# Patient Record
Sex: FEMALE | Race: WHITE | Employment: PART TIME | URBAN - METROPOLITAN AREA
[De-identification: names, ages, dates, MRNs, and addresses within clinical notes are randomized per-mention and may not be internally consistent; named-entity substitution may affect disease eponyms.]

---

## 2017-11-22 ENCOUNTER — HOSPITAL ENCOUNTER (EMERGENCY)
Facility: HOSPITAL | Age: 52
Discharge: HOME | End: 2017-11-22
Attending: EMERGENCY MEDICINE
Payer: COMMERCIAL

## 2017-11-22 VITALS
DIASTOLIC BLOOD PRESSURE: 87 MMHG | OXYGEN SATURATION: 100 % | BODY MASS INDEX: 23 KG/M2 | TEMPERATURE: 99 F | SYSTOLIC BLOOD PRESSURE: 121 MMHG | WEIGHT: 119.94 LBS | RESPIRATION RATE: 16 BRPM | HEART RATE: 69 BPM

## 2017-11-22 DIAGNOSIS — S69.91XA FISH HOOK INJURY OF FINGER OF RIGHT HAND, INITIAL ENCOUNTER: Primary | ICD-10-CM

## 2017-11-22 PROCEDURE — 10120 INC&RMVL FB SUBQ TISS SMPL: CPT

## 2017-11-22 PROCEDURE — 10120 INC&RMVL FB SUBQ TISS SMPL: CPT | Performed by: PHYSICIAN ASSISTANT

## 2017-11-22 PROCEDURE — 99283 EMERGENCY DEPT VISIT LOW MDM: CPT

## 2017-11-22 PROCEDURE — 90471 IMMUNIZATION ADMIN: CPT

## 2017-11-22 RX ORDER — CEPHALEXIN 500 MG/1
500 CAPSULE ORAL 2 TIMES DAILY
Qty: 14 CAPSULE | Refills: 0 | Status: SHIPPED | OUTPATIENT
Start: 2017-11-22 | End: 2017-11-29

## 2017-11-22 NOTE — ED PROVIDER NOTES
Patient Seen in: [facility] Emergency Department    History   Patient presents with:  FB in Skin (integumentary)    Stated Complaint: FB right index fishing lure    HPI        Past Medical History:   Diagnosis Date   • Colitis    • Colitis    • Fibr [id number]  [phone]    Schedule an appointment as soon as possible for a visit  If symptoms worsen    KEVIN Garcia/ [address] [facility]  [phone]    Schedule an appointment as soon as possible for a visit  If symptoms wors

## 2017-11-22 NOTE — ED PROVIDER NOTES
Patient Seen in: [facility] Emergency Department    History   Patient presents with:  FB in Skin (integumentary)    Stated Complaint: FB right index fishing lure    HPI    Tashia Escalona is a 61-year-old female who comes in today complaining of a fishing rhea Head: Normocephalic and atraumatic. Neck: Normal range of motion. Cardiovascular: Normal rate, regular rhythm, normal heart sounds and intact distal pulses. Pulmonary/Chest: Effort normal and breath sounds normal. No respiratory distress.  She has no Verbal consent was obtained from the patient. The  finger was anesthetized in the usual fashion using a digital block with 2 mL of 1% lidocaine without epinephrine.  Needle nose pliers were used to push the fish hook through the skin, colleen snipped and hook

## 2019-06-28 ENCOUNTER — HOSPITAL ENCOUNTER (EMERGENCY)
Facility: HOSPITAL | Age: 54
Discharge: HOME | End: 2019-06-28
Attending: EMERGENCY MEDICINE
Payer: COMMERCIAL

## 2019-06-28 ENCOUNTER — TELEPHONE (OUTPATIENT)
Dept: OBGYN CLINIC | Facility: CLINIC | Age: 54
End: 2019-06-28

## 2019-06-28 VITALS
HEART RATE: 71 BPM | HEIGHT: 60 IN | OXYGEN SATURATION: 98 % | SYSTOLIC BLOOD PRESSURE: 116 MMHG | DIASTOLIC BLOOD PRESSURE: 74 MMHG | RESPIRATION RATE: 18 BRPM | WEIGHT: 125 LBS | TEMPERATURE: 99 F | BODY MASS INDEX: 24.54 KG/M2

## 2019-06-28 DIAGNOSIS — N93.8 DYSFUNCTIONAL UTERINE BLEEDING: Primary | ICD-10-CM

## 2019-06-28 LAB
ALBUMIN SERPL-MCNC: 3.6 G/DL (ref 3.4–5)
ALBUMIN/GLOB SERPL: 1.2 {RATIO} (ref 1–2)
ALP LIVER SERPL-CCNC: 64 U/L (ref 41–108)
ALT SERPL-CCNC: 16 U/L (ref 13–56)
ANION GAP SERPL CALC-SCNC: 5 MMOL/L (ref 0–18)
AST SERPL-CCNC: 16 U/L (ref 15–37)
BASOPHILS # BLD AUTO: 0.07 X10(3) UL (ref 0–0.2)
BASOPHILS NFR BLD AUTO: 1.1 %
BILIRUB SERPL-MCNC: 0.2 MG/DL (ref 0.1–2)
BUN BLD-MCNC: 12 MG/DL (ref 7–18)
BUN/CREAT SERPL: 17.9 (ref 10–20)
CALCIUM BLD-MCNC: 8.6 MG/DL (ref 8.5–10.1)
CHLORIDE SERPL-SCNC: 109 MMOL/L (ref 98–112)
CO2 SERPL-SCNC: 27 MMOL/L (ref 21–32)
CREAT BLD-MCNC: 0.67 MG/DL (ref 0.55–1.02)
DEPRECATED RDW RBC AUTO: 42.4 FL (ref 35.1–46.3)
EOSINOPHIL # BLD AUTO: 0.18 X10(3) UL (ref 0–0.7)
EOSINOPHIL NFR BLD AUTO: 2.8 %
ERYTHROCYTE [DISTWIDTH] IN BLOOD BY AUTOMATED COUNT: 12.1 % (ref 11–15)
GLOBULIN PLAS-MCNC: 3 G/DL (ref 2.8–4.4)
GLUCOSE BLD-MCNC: 93 MG/DL (ref 70–99)
HCT VFR BLD AUTO: 38.1 % (ref 35–48)
HGB BLD-MCNC: 13 G/DL (ref 12–16)
IMM GRANULOCYTES # BLD AUTO: 0.02 X10(3) UL (ref 0–1)
IMM GRANULOCYTES NFR BLD: 0.3 %
LYMPHOCYTES # BLD AUTO: 2.01 X10(3) UL (ref 1–4)
LYMPHOCYTES NFR BLD AUTO: 31.1 %
M PROTEIN MFR SERPL ELPH: 6.6 G/DL (ref 6.4–8.2)
MCH RBC QN AUTO: 32.2 PG (ref 26–34)
MCHC RBC AUTO-ENTMCNC: 34.1 G/DL (ref 31–37)
MCV RBC AUTO: 94.3 FL (ref 80–100)
MONOCYTES # BLD AUTO: 0.5 X10(3) UL (ref 0.1–1)
MONOCYTES NFR BLD AUTO: 7.7 %
NEUTROPHILS # BLD AUTO: 3.69 X10 (3) UL (ref 1.5–7.7)
NEUTROPHILS # BLD AUTO: 3.69 X10(3) UL (ref 1.5–7.7)
NEUTROPHILS NFR BLD AUTO: 57 %
OSMOLALITY SERPL CALC.SUM OF ELEC: 291 MOSM/KG (ref 275–295)
PLATELET # BLD AUTO: 267 10(3)UL (ref 150–450)
POTASSIUM SERPL-SCNC: 4 MMOL/L (ref 3.5–5.1)
RBC # BLD AUTO: 4.04 X10(6)UL (ref 3.8–5.3)
SODIUM SERPL-SCNC: 141 MMOL/L (ref 136–145)
WBC # BLD AUTO: 6.5 X10(3) UL (ref 4–11)

## 2019-06-28 PROCEDURE — 99284 EMERGENCY DEPT VISIT MOD MDM: CPT

## 2019-06-28 PROCEDURE — 96360 HYDRATION IV INFUSION INIT: CPT

## 2019-06-28 PROCEDURE — 85025 COMPLETE CBC W/AUTO DIFF WBC: CPT | Performed by: EMERGENCY MEDICINE

## 2019-06-28 PROCEDURE — 80053 COMPREHEN METABOLIC PANEL: CPT | Performed by: EMERGENCY MEDICINE

## 2019-06-28 RX ORDER — TRAMADOL HYDROCHLORIDE 50 MG/1
TABLET ORAL EVERY 6 HOURS PRN
Qty: 10 TABLET | Refills: 0 | Status: SHIPPED | OUTPATIENT
Start: 2019-06-28 | End: 2019-07-05

## 2019-06-28 RX ORDER — ONDANSETRON 2 MG/ML
4 INJECTION INTRAMUSCULAR; INTRAVENOUS ONCE
Status: DISCONTINUED | OUTPATIENT
Start: 2019-06-28 | End: 2019-06-28

## 2019-06-28 NOTE — ED NOTES
This RN responded to Pt's call light. Pt stated \"If you guys are too busy (pelvic exam), I'll just follow-up with my doctor. I just don't want to lie down here waiting. \" Will notify Dr. Max Alcala. Pt ambulated to the washroom with steady gait noted.  ED Adamaris

## 2019-06-28 NOTE — TELEPHONE ENCOUNTER
PC with patient. Called to see how she is doing and if she is going to follow up after the ER visit? She states doing better. Mild cramping and spotting. She states she has to check her schedule and will call back to follow up.

## 2019-06-28 NOTE — ED INITIAL ASSESSMENT (HPI)
Pt to ED with complaints of generalized abdominal cramping this past week. Pt reports she has not gotten a period in the past 6 months but this week she began having light vaginal bleeding on Tuesday. Today cramping has increased.

## 2019-06-28 NOTE — ED PROVIDER NOTES
Patient Seen in: [facility] Emergency Department    History   Patient presents with:  Abdomen/Flank Pain (GI/)  Eval-G (genital)    Stated Complaint: pt states she had an ablasion last year, having abd cramping, bleeding, and NV     HPI  54-year-o (5')   Wt 56.7 kg   SpO2 98%   BMI 24.41 kg/m²         Physical Exam   Constitutional: She is oriented to person, place, and time. She appears well-developed and well-nourished. HENT:   Head: Normocephalic and atraumatic.    Eyes: Pupils are equal, round, her GYN at this time. Discussed close follow-up strict return precautions and follow-up instructions. Patient shows understanding of plan and agreeable to plan discharged home in improved condition.               Disposition and Plan     Clinical Mareni

## 2020-07-16 ENCOUNTER — OFFICE VISIT (OUTPATIENT)
Dept: OBGYN CLINIC | Facility: CLINIC | Age: 55
End: 2020-07-16
Payer: COMMERCIAL

## 2020-07-16 VITALS
DIASTOLIC BLOOD PRESSURE: 74 MMHG | SYSTOLIC BLOOD PRESSURE: 126 MMHG | HEART RATE: 76 BPM | HEIGHT: 61 IN | WEIGHT: 135 LBS | BODY MASS INDEX: 25.49 KG/M2

## 2020-07-16 DIAGNOSIS — K52.832 LYMPHOCYTIC COLITIS: ICD-10-CM

## 2020-07-16 DIAGNOSIS — M62.89 PELVIC FLOOR DYSFUNCTION: ICD-10-CM

## 2020-07-16 DIAGNOSIS — R10.2 PELVIC PAIN: Primary | ICD-10-CM

## 2020-07-16 DIAGNOSIS — N85.2 ENLARGED UTERUS: ICD-10-CM

## 2020-07-16 PROCEDURE — 99204 OFFICE O/P NEW MOD 45 MIN: CPT | Performed by: OBSTETRICS & GYNECOLOGY

## 2020-07-16 PROCEDURE — 3078F DIAST BP <80 MM HG: CPT | Performed by: OBSTETRICS & GYNECOLOGY

## 2020-07-16 PROCEDURE — 3074F SYST BP LT 130 MM HG: CPT | Performed by: OBSTETRICS & GYNECOLOGY

## 2020-07-16 PROCEDURE — 3008F BODY MASS INDEX DOCD: CPT | Performed by: OBSTETRICS & GYNECOLOGY

## 2020-07-16 RX ORDER — ALBUTEROL SULFATE 90 UG/1
2 AEROSOL, METERED RESPIRATORY (INHALATION) AS NEEDED
COMMUNITY
Start: 2020-06-08

## 2020-07-16 RX ORDER — CYCLOBENZAPRINE HCL 5 MG
5 TABLET ORAL 3 TIMES DAILY PRN
Qty: 30 TABLET | Refills: 0 | Status: ON HOLD | OUTPATIENT
Start: 2020-07-16 | End: 2020-08-14

## 2020-07-16 RX ORDER — LISINOPRIL 10 MG/1
10 TABLET ORAL DAILY
COMMUNITY
Start: 2020-07-04

## 2020-07-16 RX ORDER — BUPROPION HYDROCHLORIDE 150 MG/1
150 TABLET, EXTENDED RELEASE ORAL DAILY
COMMUNITY

## 2020-07-16 NOTE — H&P
[facility]  Obstetrics and Gynecology   History & Physical  NEW    Chief complaint: Patient presents with:  Gyn Problem: pt c/o severe pelvic pains. Seen PCP Tuesday, sent for CT scan and u/s.      Subjective:     HPI: Pascale Gonzalez is a 50 year (outside facility) - no images available    Pelvic US 7/15/2020 (outside facility) - enlarged, heterogeneous uterus with no discrete uterine fibroid identified. Tiny sub-endometrial cysts. Underlying mild adenomyosis would be difficult to exclude.  10 x 8 x Weight Sex Delivery Anes PTL Lv   10 Term 08/11/03    F NORMAL SPONT   DONOVAN   9 Term 09/09/99    F NORMAL SPONT   DONOVAN   8 Term 03/05/97    M NORMAL SPONT   DONOVAN   7 Term 11/08/95    M NORMAL SPONT   DONOVAN   6 Term 11/03/93    F NORMAL SPONT   DONOVAN   5 Term 02/2 allie 6/20/2013    Log Date: 01/30/2013    • Environmental allergies    • Essential hypertension    • GERD (gastroesophageal reflux disease) 2016   • Hypothyroid 04/16/2012   • Insomnia 2020   • Late effect of fracture of upper extremities 6/20/2013    Lo education: Not on file      Highest education level: Not on file    Occupational History      Not on file    Social Needs      Financial resource strain: Not on file      Food insecurity:        Worry: Not on file        Inability: Not on file      Transpo kg/m². Physical Exam:  Physical Exam   Nursing note and vitals reviewed. Constitutional: She is oriented to person, place, and time. She appears well-developed and well-nourished.    HENT:   Head: Normocephalic and atraumatic.   +Facemask   Eyes: Conju K 4.0 06/28/2019     06/28/2019    CO2 27.0 06/28/2019       Lab Results   Component Value Date    CHOLEST 190 09/04/2014    HDL 80 09/04/2014    LDL 98 09/04/2014    VLDL 12 09/04/2014        Lab Results   Component Value Date    T4F 1.41 09/04/2 sonohysterogram as clinically indicated.     Electronically Signed By: Lupe Polk,  on 7/15/2020 5:42 PM CDT    CT abd/pelvis with IV contrast 7/14/2020 [facility]  Epic, Interface Incoming Radiology Results - 07/14/2020  6:01 PM CDT  CT Abdomen and Pelvis wi of these painful episodes. Diagnoses and all orders for this visit:    Pelvic pain    Enlarged uterus  -     OP REFERRAL TO [facility]    Pelvic floor dysfunction  -     cyclobenzaprine 5 MG Oral Tab;  Take 1 tablet (5 mg total) by that all her pain will be resolved by this kind of procedure alone.   -she wishes to schedule hysterectomy  -will talk to OR about availability  -would recommend robotic assisted total laparoscopic hysterectomy, cystoscopy, possible laparotomy (her fallopi

## 2020-07-21 ENCOUNTER — TELEPHONE (OUTPATIENT)
Dept: OBGYN CLINIC | Facility: CLINIC | Age: 55
End: 2020-07-21

## 2020-07-21 DIAGNOSIS — R10.2 PELVIC PAIN IN FEMALE: Primary | ICD-10-CM

## 2020-07-21 DIAGNOSIS — N85.2 ENLARGED UTERUS: ICD-10-CM

## 2020-07-21 NOTE — TELEPHONE ENCOUNTER
PC with patient, she wanted to know how long it will take to schedule her surgery for hysterectomy.  Lazaro advise

## 2020-07-23 ENCOUNTER — TELEPHONE (OUTPATIENT)
Dept: OBGYN CLINIC | Facility: CLINIC | Age: 55
End: 2020-07-23

## 2020-07-28 ENCOUNTER — OFFICE VISIT (OUTPATIENT)
Dept: OBGYN CLINIC | Facility: CLINIC | Age: 55
End: 2020-07-28
Payer: COMMERCIAL

## 2020-07-28 VITALS
HEART RATE: 86 BPM | HEIGHT: 61 IN | SYSTOLIC BLOOD PRESSURE: 118 MMHG | BODY MASS INDEX: 25.3 KG/M2 | DIASTOLIC BLOOD PRESSURE: 80 MMHG | WEIGHT: 134 LBS

## 2020-07-28 DIAGNOSIS — R11.2 PONV (POSTOPERATIVE NAUSEA AND VOMITING): ICD-10-CM

## 2020-07-28 DIAGNOSIS — Z12.4 SCREENING FOR CERVICAL CANCER: ICD-10-CM

## 2020-07-28 DIAGNOSIS — N64.89 BREAST ASYMMETRY: ICD-10-CM

## 2020-07-28 DIAGNOSIS — Z01.818 PREOPERATIVE EXAM FOR GYNECOLOGIC SURGERY: ICD-10-CM

## 2020-07-28 DIAGNOSIS — N85.2 ENLARGED UTERUS: ICD-10-CM

## 2020-07-28 DIAGNOSIS — R92.2 DENSE BREASTS: ICD-10-CM

## 2020-07-28 DIAGNOSIS — N63.22 MASS OF UPPER INNER QUADRANT OF LEFT BREAST: ICD-10-CM

## 2020-07-28 DIAGNOSIS — Z98.890 PONV (POSTOPERATIVE NAUSEA AND VOMITING): ICD-10-CM

## 2020-07-28 DIAGNOSIS — R10.2 PELVIC PAIN: ICD-10-CM

## 2020-07-28 DIAGNOSIS — Z12.31 ENCOUNTER FOR SCREENING MAMMOGRAM FOR MALIGNANT NEOPLASM OF BREAST: ICD-10-CM

## 2020-07-28 DIAGNOSIS — Z01.411 ENCOUNTER FOR WELL WOMAN EXAM WITH ABNORMAL FINDINGS: Primary | ICD-10-CM

## 2020-07-28 PROCEDURE — 3074F SYST BP LT 130 MM HG: CPT | Performed by: OBSTETRICS & GYNECOLOGY

## 2020-07-28 PROCEDURE — 88175 CYTOPATH C/V AUTO FLUID REDO: CPT | Performed by: OBSTETRICS & GYNECOLOGY

## 2020-07-28 PROCEDURE — 3008F BODY MASS INDEX DOCD: CPT | Performed by: OBSTETRICS & GYNECOLOGY

## 2020-07-28 PROCEDURE — 87624 HPV HI-RISK TYP POOLED RSLT: CPT | Performed by: OBSTETRICS & GYNECOLOGY

## 2020-07-28 PROCEDURE — 3079F DIAST BP 80-89 MM HG: CPT | Performed by: OBSTETRICS & GYNECOLOGY

## 2020-07-28 PROCEDURE — 99396 PREV VISIT EST AGE 40-64: CPT | Performed by: OBSTETRICS & GYNECOLOGY

## 2020-07-28 RX ORDER — SCOLOPAMINE TRANSDERMAL SYSTEM 1 MG/1
1 PATCH, EXTENDED RELEASE TRANSDERMAL
Qty: 3 PATCH | Refills: 0 | Status: SHIPPED | OUTPATIENT
Start: 2020-07-28 | End: 2020-09-01

## 2020-07-28 NOTE — H&P
[facility]  Obstetrics and Gynecology     Chief complaint: Patient presents with:  Wellness Visit  Pre-Op Exam: hysterectomy 8/14/20    Subjective:     HPI: Rosa Mcdermott is a 50year old [id number] with LMP Patient's last menstrual period was 08/ Hx:   Menarche [age]   Is sexually active. Sometimes dyspareunia. H/o enlarged uterus but without distinct fibroids noted as far back as 3/2013 pelvic US.    [address], D&C 6/12/2017 - reportedly with polyps  3/14/2018 [facility], D&C, HTA ablation & laparoscopic bi NORMAL SPONT   DONOVAN   8 Term 03/05/97    M NORMAL SPONT   DONOVAN   7 Term 11/08/95    M NORMAL SPONT   DONOVAN   6 Term 11/03/93    F NORMAL SPONT   DONOVAN   5 Term 02/26/86    M NORMAL SPONT   DONOVAN   4 SAB            3 SAB            2 SAB            1 SAB adenomyosis.     • Enthesopathy of wrist and carpus 6/20/2013    Log Date: 01/30/2013    • Environmental allergies    • Essential hypertension    • GERD (gastroesophageal reflux disease) 2016   • Hypothyroid 04/16/2012   • Insomnia 2020   • Late effect of f History:  Social History    Socioeconomic History      Marital status:       Spouse name: Not on file      Number of children: Not on file      Years of education: Not on file      Highest education level: Not on file    Occupational History      No severe dysmenorrhea. Objective:      07/28/20  1049   BP: 118/80   Pulse: 86   Weight: 134 lb (60.8 kg)   Height: 61\"       Body mass index is 25.32 kg/m². Physical Exam:  Physical Exam   Nursing note and vitals reviewed.    Constitutional: She GFRNAA 101 06/28/2019    GFRAA 117 06/28/2019    CA 8.6 06/28/2019    OSMOCALC 291 06/28/2019    ALKPHO 64 06/28/2019    AST 16 06/28/2019    ALT 16 06/28/2019    BILT 0.2 06/28/2019    TP 6.6 06/28/2019    ALB 3.6 06/28/2019    GLOBULIN 3.0 06/28/2019 cysts. Underlying mild adenomyosis would be difficult to exclude. If indicated this can be further evaluated with MRI. 2. 10 x 8 x 9 mm hypoechoic focus mid to upper endometrium.  Question possible underlying scar or adhesion this appearance is somewhat Elmira Stern is a 50year old [id number] likely perimenopausal female here for well woman exam and preoperative visit. She desires definitive management for her pelvic pain, which she believes is uterine in origin.    She has an enlarged uterus without -she is doing ok today. Flexeril helped. -Pelvic floor PT referral given previously. Still encouraged.   -she still wants to proceed with surgery to remove uterus.    -plan robotic assisted total laparoscopic hysterectomy, cystoscopy, possible laparotom

## 2020-07-28 NOTE — H&P (VIEW-ONLY)
[facility]  Obstetrics and Gynecology     Chief complaint: Patient presents with:  Wellness Visit  Pre-Op Exam: hysterectomy 8/14/20    Subjective:     HPI: Lorena New is a 50year old [id number] with LMP Patient's last menstrual period was 08/ Hx:   Menarche [age]   Is sexually active. Sometimes dyspareunia. H/o enlarged uterus but without distinct fibroids noted as far back as 3/2013 pelvic US.    [address], D&C 6/12/2017 - reportedly with polyps  3/14/2018 [facility], D&C, HTA ablation & laparoscopic bi NORMAL SPONT   DONOVAN   8 Term 03/05/97    M NORMAL SPONT   DONOVAN   7 Term 11/08/95    M NORMAL SPONT   DONOVAN   6 Term 11/03/93    F NORMAL SPONT   DONOVAN   5 Term 02/26/86    M NORMAL SPONT   DONOVAN   4 SAB            3 SAB            2 SAB            1 SAB adenomyosis.     • Enthesopathy of wrist and carpus 6/20/2013    Log Date: 01/30/2013    • Environmental allergies    • Essential hypertension    • GERD (gastroesophageal reflux disease) 2016   • Hypothyroid 04/16/2012   • Insomnia 2020   • Late effect of f History:  Social History    Socioeconomic History      Marital status:       Spouse name: Not on file      Number of children: Not on file      Years of education: Not on file      Highest education level: Not on file    Occupational History      No severe dysmenorrhea. Objective:      07/28/20  1049   BP: 118/80   Pulse: 86   Weight: 134 lb (60.8 kg)   Height: 61\"       Body mass index is 25.32 kg/m². Physical Exam:  Physical Exam   Nursing note and vitals reviewed.    Constitutional: She GFRNAA 101 06/28/2019    GFRAA 117 06/28/2019    CA 8.6 06/28/2019    OSMOCALC 291 06/28/2019    ALKPHO 64 06/28/2019    AST 16 06/28/2019    ALT 16 06/28/2019    BILT 0.2 06/28/2019    TP 6.6 06/28/2019    ALB 3.6 06/28/2019    GLOBULIN 3.0 06/28/2019 cysts. Underlying mild adenomyosis would be difficult to exclude. If indicated this can be further evaluated with MRI. 2. 10 x 8 x 9 mm hypoechoic focus mid to upper endometrium.  Question possible underlying scar or adhesion this appearance is somewhat Emily Preciado is a 50year old [id number] likely perimenopausal female here for well woman exam and preoperative visit. She desires definitive management for her pelvic pain, which she believes is uterine in origin.    She has an enlarged uterus without -she is doing ok today. Flexeril helped. -Pelvic floor PT referral given previously. Still encouraged.   -she still wants to proceed with surgery to remove uterus.    -plan robotic assisted total laparoscopic hysterectomy, cystoscopy, possible laparotom

## 2020-07-29 LAB — HPV I/H RISK 1 DNA SPEC QL NAA+PROBE: NEGATIVE

## 2020-08-03 ENCOUNTER — TELEPHONE (OUTPATIENT)
Dept: OBGYN CLINIC | Facility: CLINIC | Age: 55
End: 2020-08-03

## 2020-08-03 ENCOUNTER — HOSPITAL ENCOUNTER (OUTPATIENT)
Dept: MAMMOGRAPHY | Facility: HOSPITAL | Age: 55
Discharge: HOME | End: 2020-08-03
Attending: OBSTETRICS & GYNECOLOGY
Payer: COMMERCIAL

## 2020-08-03 DIAGNOSIS — N63.22 MASS OF UPPER INNER QUADRANT OF LEFT BREAST: ICD-10-CM

## 2020-08-03 DIAGNOSIS — R92.2 DENSE BREASTS: ICD-10-CM

## 2020-08-03 DIAGNOSIS — N64.89 BREAST ASYMMETRY: ICD-10-CM

## 2020-08-03 DIAGNOSIS — R92.8 ABNORMAL MAMMOGRAM OF LEFT BREAST: Primary | ICD-10-CM

## 2020-08-03 PROCEDURE — 76642 ULTRASOUND BREAST LIMITED: CPT | Performed by: OBSTETRICS & GYNECOLOGY

## 2020-08-03 PROCEDURE — 77062 BREAST TOMOSYNTHESIS BI: CPT | Performed by: OBSTETRICS & GYNECOLOGY

## 2020-08-03 PROCEDURE — 77066 DX MAMMO INCL CAD BI: CPT | Performed by: OBSTETRICS & GYNECOLOGY

## 2020-08-03 NOTE — TELEPHONE ENCOUNTER
Patient states that the medication Dr. Edward Lawrence prescribed (cyclobenzaprine 5 MG Oral ) is not helping with her pain. Patient also took Tramadol 50 mg before without any relief. Patient is requesting pain medications.  Will check with Dr. Edward Lawrence and omar

## 2020-08-03 NOTE — TELEPHONE ENCOUNTER
Patient aware of Dr. Phu Hester recommendation to increase Flexeril to 10mg every 8 hrs as needed and if no improvement patient to go to ER. Patient verbalizes understanding.

## 2020-08-03 NOTE — IMAGING NOTE
Assisted Dr Tyler Jensen with left breast biopsy recommendation, BIRADS 4a. Explained procedure and written instructions given. Pt screened and denies blood thinners, bleeding issues, chemo or liver disease.  Reviewed to eat, take 1000 mg of acetaminophen, and

## 2020-08-03 NOTE — TELEPHONE ENCOUNTER
[facility] [place] [facility] radiology called with request for order for Ultrasound guided left breast biopsy.  Order placed and routed to provider

## 2020-08-05 RX ORDER — SODIUM CHLORIDE, SODIUM LACTATE, POTASSIUM CHLORIDE, CALCIUM CHLORIDE 600; 310; 30; 20 MG/100ML; MG/100ML; MG/100ML; MG/100ML
INJECTION, SOLUTION INTRAVENOUS CONTINUOUS
Status: CANCELLED | OUTPATIENT
Start: 2020-08-05

## 2020-08-08 ENCOUNTER — APPOINTMENT (OUTPATIENT)
Dept: LAB | Facility: HOSPITAL | Age: 55
End: 2020-08-08
Payer: COMMERCIAL

## 2020-08-08 DIAGNOSIS — R10.2 PELVIC PAIN IN FEMALE: ICD-10-CM

## 2020-08-08 LAB
ANION GAP SERPL CALC-SCNC: 4 MMOL/L (ref 0–18)
BUN BLD-MCNC: 10 MG/DL (ref 7–18)
BUN/CREAT SERPL: 14.9 (ref 10–20)
CALCIUM BLD-MCNC: 8.9 MG/DL (ref 8.5–10.1)
CHLORIDE SERPL-SCNC: 108 MMOL/L (ref 98–112)
CO2 SERPL-SCNC: 28 MMOL/L (ref 21–32)
CREAT BLD-MCNC: 0.67 MG/DL (ref 0.55–1.02)
DEPRECATED RDW RBC AUTO: 42 FL (ref 35.1–46.3)
ERYTHROCYTE [DISTWIDTH] IN BLOOD BY AUTOMATED COUNT: 12 % (ref 11–15)
GLUCOSE BLD-MCNC: 91 MG/DL (ref 70–99)
HCT VFR BLD AUTO: 39.9 % (ref 35–48)
HGB BLD-MCNC: 13.7 G/DL (ref 12–16)
MCH RBC QN AUTO: 32.6 PG (ref 26–34)
MCHC RBC AUTO-ENTMCNC: 34.3 G/DL (ref 31–37)
MCV RBC AUTO: 95 FL (ref 80–100)
OSMOLALITY SERPL CALC.SUM OF ELEC: 289 MOSM/KG (ref 275–295)
PATIENT FASTING Y/N/NP: YES
PLATELET # BLD AUTO: 269 10(3)UL (ref 150–450)
POTASSIUM SERPL-SCNC: 4.1 MMOL/L (ref 3.5–5.1)
RBC # BLD AUTO: 4.2 X10(6)UL (ref 3.8–5.3)
SODIUM SERPL-SCNC: 140 MMOL/L (ref 136–145)
WBC # BLD AUTO: 4.9 X10(3) UL (ref 4–11)

## 2020-08-08 PROCEDURE — 36415 COLL VENOUS BLD VENIPUNCTURE: CPT

## 2020-08-08 PROCEDURE — 80048 BASIC METABOLIC PNL TOTAL CA: CPT

## 2020-08-08 PROCEDURE — 85027 COMPLETE CBC AUTOMATED: CPT

## 2020-08-11 ENCOUNTER — LAB ENCOUNTER (OUTPATIENT)
Dept: LAB | Facility: HOSPITAL | Age: 55
End: 2020-08-11
Attending: OBSTETRICS & GYNECOLOGY
Payer: COMMERCIAL

## 2020-08-11 DIAGNOSIS — R10.2 PELVIC PAIN IN FEMALE: ICD-10-CM

## 2020-08-12 ENCOUNTER — HOSPITAL ENCOUNTER (OUTPATIENT)
Dept: MAMMOGRAPHY | Facility: HOSPITAL | Age: 55
Discharge: HOME | End: 2020-08-12
Attending: OBSTETRICS & GYNECOLOGY
Payer: COMMERCIAL

## 2020-08-12 DIAGNOSIS — R92.8 ABNORMAL MAMMOGRAM OF LEFT BREAST: ICD-10-CM

## 2020-08-12 LAB — SARS-COV-2 RNA RESP QL NAA+PROBE: NOT DETECTED

## 2020-08-12 PROCEDURE — 88305 TISSUE EXAM BY PATHOLOGIST: CPT | Performed by: OBSTETRICS & GYNECOLOGY

## 2020-08-12 PROCEDURE — 19083 BX BREAST 1ST LESION US IMAG: CPT | Performed by: OBSTETRICS & GYNECOLOGY

## 2020-08-12 PROCEDURE — 77065 DX MAMMO INCL CAD UNI: CPT | Performed by: OBSTETRICS & GYNECOLOGY

## 2020-08-13 ENCOUNTER — TELEPHONE (OUTPATIENT)
Dept: OBGYN CLINIC | Facility: CLINIC | Age: 55
End: 2020-08-13

## 2020-08-13 ENCOUNTER — ANESTHESIA EVENT (OUTPATIENT)
Dept: SURGERY | Facility: HOSPITAL | Age: 55
End: 2020-08-13
Payer: COMMERCIAL

## 2020-08-13 NOTE — TELEPHONE ENCOUNTER
Dr Khang Huffman would like us to check with patient on if she would like to proceed with surgery as planned tomorrow. Patient underwent a breast biopsy today and doctor has a concern about surgery based on what her results will be.  LM for patient to call offi

## 2020-08-14 ENCOUNTER — HOSPITAL ENCOUNTER (OUTPATIENT)
Facility: HOSPITAL | Age: 55
Discharge: HOME | End: 2020-08-15
Attending: OBSTETRICS & GYNECOLOGY | Admitting: OBSTETRICS & GYNECOLOGY
Payer: COMMERCIAL

## 2020-08-14 ENCOUNTER — ANESTHESIA (OUTPATIENT)
Dept: SURGERY | Facility: HOSPITAL | Age: 55
End: 2020-08-14
Payer: COMMERCIAL

## 2020-08-14 ENCOUNTER — TELEPHONE (OUTPATIENT)
Dept: MAMMOGRAPHY | Facility: HOSPITAL | Age: 55
End: 2020-08-14

## 2020-08-14 DIAGNOSIS — M62.89 PELVIC FLOOR DYSFUNCTION: ICD-10-CM

## 2020-08-14 DIAGNOSIS — R10.2 PELVIC PAIN IN FEMALE: Primary | ICD-10-CM

## 2020-08-14 DIAGNOSIS — N85.2 ENLARGED UTERUS: ICD-10-CM

## 2020-08-14 PROBLEM — Z98.890 STATUS POST CYSTOSCOPY: Status: ACTIVE | Noted: 2020-08-14

## 2020-08-14 PROBLEM — Z90.710 STATUS POST LAPAROSCOPIC HYSTERECTOMY: Status: ACTIVE | Noted: 2020-08-14

## 2020-08-14 LAB
POCT LOT NUMBER: NORMAL
POCT URINE PREGNANCY: NEGATIVE

## 2020-08-14 PROCEDURE — 8E0W8CZ ROBOTIC ASSISTED PROCEDURE OF TRUNK REGION, VIA NATURAL OR ARTIFICIAL OPENING ENDOSCOPIC: ICD-10-PCS | Performed by: OBSTETRICS & GYNECOLOGY

## 2020-08-14 PROCEDURE — 0UT94ZZ RESECTION OF UTERUS, PERCUTANEOUS ENDOSCOPIC APPROACH: ICD-10-PCS | Performed by: OBSTETRICS & GYNECOLOGY

## 2020-08-14 PROCEDURE — 58570 TLH UTERUS 250 G OR LESS: CPT | Performed by: OBSTETRICS & GYNECOLOGY

## 2020-08-14 RX ORDER — ONDANSETRON 2 MG/ML
4 INJECTION INTRAMUSCULAR; INTRAVENOUS EVERY 8 HOURS PRN
Status: DISCONTINUED | OUTPATIENT
Start: 2020-08-14 | End: 2020-08-15

## 2020-08-14 RX ORDER — ACETAMINOPHEN 500 MG
1000 TABLET ORAL ONCE
COMMUNITY
End: 2020-09-01

## 2020-08-14 RX ORDER — TRAMADOL HYDROCHLORIDE 50 MG/1
50 TABLET ORAL EVERY 6 HOURS PRN
Status: DISCONTINUED | OUTPATIENT
Start: 2020-08-14 | End: 2020-08-15

## 2020-08-14 RX ORDER — LISINOPRIL 10 MG/1
10 TABLET ORAL NIGHTLY
Status: DISCONTINUED | OUTPATIENT
Start: 2020-08-14 | End: 2020-08-15

## 2020-08-14 RX ORDER — TEMAZEPAM 7.5 MG/1
7.5 CAPSULE ORAL NIGHTLY PRN
COMMUNITY

## 2020-08-14 RX ORDER — KETOROLAC TROMETHAMINE 30 MG/ML
INJECTION, SOLUTION INTRAMUSCULAR; INTRAVENOUS AS NEEDED
Status: DISCONTINUED | OUTPATIENT
Start: 2020-08-14 | End: 2020-08-14 | Stop reason: SURG

## 2020-08-14 RX ORDER — ROCURONIUM BROMIDE 10 MG/ML
INJECTION, SOLUTION INTRAVENOUS AS NEEDED
Status: DISCONTINUED | OUTPATIENT
Start: 2020-08-14 | End: 2020-08-14 | Stop reason: SURG

## 2020-08-14 RX ORDER — CYCLOBENZAPRINE HCL 10 MG
10 TABLET ORAL 3 TIMES DAILY PRN
Status: DISCONTINUED | OUTPATIENT
Start: 2020-08-14 | End: 2020-08-15

## 2020-08-14 RX ORDER — DEXAMETHASONE SODIUM PHOSPHATE 4 MG/ML
VIAL (ML) INJECTION AS NEEDED
Status: DISCONTINUED | OUTPATIENT
Start: 2020-08-14 | End: 2020-08-14 | Stop reason: SURG

## 2020-08-14 RX ORDER — MIDAZOLAM HYDROCHLORIDE 1 MG/ML
1 INJECTION INTRAMUSCULAR; INTRAVENOUS EVERY 5 MIN PRN
Status: DISCONTINUED | OUTPATIENT
Start: 2020-08-14 | End: 2020-08-14 | Stop reason: HOSPADM

## 2020-08-14 RX ORDER — TEMAZEPAM 7.5 MG/1
7.5 CAPSULE ORAL NIGHTLY PRN
Status: DISCONTINUED | OUTPATIENT
Start: 2020-08-14 | End: 2020-08-15

## 2020-08-14 RX ORDER — ONDANSETRON 2 MG/ML
4 INJECTION INTRAMUSCULAR; INTRAVENOUS EVERY 6 HOURS PRN
Status: DISCONTINUED | OUTPATIENT
Start: 2020-08-14 | End: 2020-08-14 | Stop reason: HOSPADM

## 2020-08-14 RX ORDER — HYDROMORPHONE HYDROCHLORIDE 1 MG/ML
0.2 INJECTION, SOLUTION INTRAMUSCULAR; INTRAVENOUS; SUBCUTANEOUS EVERY 2 HOUR PRN
Status: DISCONTINUED | OUTPATIENT
Start: 2020-08-14 | End: 2020-08-15

## 2020-08-14 RX ORDER — LEVOTHYROXINE SODIUM 0.05 MG/1
50 TABLET ORAL
Status: DISCONTINUED | OUTPATIENT
Start: 2020-08-15 | End: 2020-08-15

## 2020-08-14 RX ORDER — HYDROMORPHONE HYDROCHLORIDE 1 MG/ML
0.4 INJECTION, SOLUTION INTRAMUSCULAR; INTRAVENOUS; SUBCUTANEOUS EVERY 2 HOUR PRN
Status: DISCONTINUED | OUTPATIENT
Start: 2020-08-14 | End: 2020-08-15

## 2020-08-14 RX ORDER — SODIUM CHLORIDE, SODIUM LACTATE, POTASSIUM CHLORIDE, CALCIUM CHLORIDE 600; 310; 30; 20 MG/100ML; MG/100ML; MG/100ML; MG/100ML
INJECTION, SOLUTION INTRAVENOUS CONTINUOUS
Status: DISCONTINUED | OUTPATIENT
Start: 2020-08-14 | End: 2020-08-14 | Stop reason: HOSPADM

## 2020-08-14 RX ORDER — ALBUTEROL SULFATE 90 UG/1
2 AEROSOL, METERED RESPIRATORY (INHALATION) EVERY 4 HOURS PRN
Status: DISCONTINUED | OUTPATIENT
Start: 2020-08-14 | End: 2020-08-15

## 2020-08-14 RX ORDER — BISACODYL 10 MG
10 SUPPOSITORY, RECTAL RECTAL
Status: DISCONTINUED | OUTPATIENT
Start: 2020-08-14 | End: 2020-08-15

## 2020-08-14 RX ORDER — ACETAMINOPHEN 500 MG
1000 TABLET ORAL ONCE
Status: DISCONTINUED | OUTPATIENT
Start: 2020-08-14 | End: 2020-08-14 | Stop reason: HOSPADM

## 2020-08-14 RX ORDER — EPHEDRINE SULFATE 50 MG/ML
INJECTION, SOLUTION INTRAVENOUS AS NEEDED
Status: DISCONTINUED | OUTPATIENT
Start: 2020-08-14 | End: 2020-08-14 | Stop reason: SURG

## 2020-08-14 RX ORDER — BUPIVACAINE HYDROCHLORIDE 5 MG/ML
INJECTION, SOLUTION EPIDURAL; INTRACAUDAL AS NEEDED
Status: DISCONTINUED | OUTPATIENT
Start: 2020-08-14 | End: 2020-08-14 | Stop reason: HOSPADM

## 2020-08-14 RX ORDER — NALOXONE HYDROCHLORIDE 0.4 MG/ML
80 INJECTION, SOLUTION INTRAMUSCULAR; INTRAVENOUS; SUBCUTANEOUS AS NEEDED
Status: DISCONTINUED | OUTPATIENT
Start: 2020-08-14 | End: 2020-08-14 | Stop reason: HOSPADM

## 2020-08-14 RX ORDER — HYDROMORPHONE HYDROCHLORIDE 1 MG/ML
0.8 INJECTION, SOLUTION INTRAMUSCULAR; INTRAVENOUS; SUBCUTANEOUS EVERY 2 HOUR PRN
Status: DISCONTINUED | OUTPATIENT
Start: 2020-08-14 | End: 2020-08-15

## 2020-08-14 RX ORDER — DIPHENHYDRAMINE HYDROCHLORIDE 50 MG/ML
12.5 INJECTION INTRAMUSCULAR; INTRAVENOUS EVERY 4 HOURS PRN
Status: DISCONTINUED | OUTPATIENT
Start: 2020-08-14 | End: 2020-08-15

## 2020-08-14 RX ORDER — CEFAZOLIN SODIUM/WATER 2 G/20 ML
2 SYRINGE (ML) INTRAVENOUS ONCE
Status: COMPLETED | OUTPATIENT
Start: 2020-08-14 | End: 2020-08-14

## 2020-08-14 RX ORDER — KETOROLAC TROMETHAMINE 30 MG/ML
30 INJECTION, SOLUTION INTRAMUSCULAR; INTRAVENOUS EVERY 6 HOURS
Status: DISCONTINUED | OUTPATIENT
Start: 2020-08-14 | End: 2020-08-15

## 2020-08-14 RX ORDER — DOCUSATE SODIUM 100 MG/1
100 CAPSULE, LIQUID FILLED ORAL 2 TIMES DAILY
Status: DISCONTINUED | OUTPATIENT
Start: 2020-08-14 | End: 2020-08-15

## 2020-08-14 RX ORDER — CYCLOBENZAPRINE HCL 5 MG
TABLET ORAL 3 TIMES DAILY PRN
Qty: 30 TABLET | Refills: 0 | Status: SHIPPED | OUTPATIENT
Start: 2020-08-14 | End: 2020-09-01

## 2020-08-14 RX ORDER — MIDAZOLAM HYDROCHLORIDE 1 MG/ML
INJECTION INTRAMUSCULAR; INTRAVENOUS AS NEEDED
Status: DISCONTINUED | OUTPATIENT
Start: 2020-08-14 | End: 2020-08-14 | Stop reason: SURG

## 2020-08-14 RX ORDER — ONDANSETRON 2 MG/ML
4 INJECTION INTRAMUSCULAR; INTRAVENOUS AS NEEDED
Status: DISCONTINUED | OUTPATIENT
Start: 2020-08-14 | End: 2020-08-14 | Stop reason: HOSPADM

## 2020-08-14 RX ORDER — SODIUM PHOSPHATE, DIBASIC AND SODIUM PHOSPHATE, MONOBASIC 7; 19 G/133ML; G/133ML
1 ENEMA RECTAL ONCE AS NEEDED
Status: DISCONTINUED | OUTPATIENT
Start: 2020-08-14 | End: 2020-08-15

## 2020-08-14 RX ORDER — ENOXAPARIN SODIUM 100 MG/ML
40 INJECTION SUBCUTANEOUS NIGHTLY
Status: DISCONTINUED | OUTPATIENT
Start: 2020-08-14 | End: 2020-08-15

## 2020-08-14 RX ORDER — METOCLOPRAMIDE HYDROCHLORIDE 5 MG/ML
10 INJECTION INTRAMUSCULAR; INTRAVENOUS AS NEEDED
Status: DISCONTINUED | OUTPATIENT
Start: 2020-08-14 | End: 2020-08-14 | Stop reason: HOSPADM

## 2020-08-14 RX ORDER — DROPERIDOL 2.5 MG/ML
INJECTION, SOLUTION INTRAMUSCULAR; INTRAVENOUS AS NEEDED
Status: DISCONTINUED | OUTPATIENT
Start: 2020-08-14 | End: 2020-08-14 | Stop reason: SURG

## 2020-08-14 RX ORDER — ONDANSETRON 4 MG/1
4 TABLET, FILM COATED ORAL EVERY 8 HOURS PRN
Status: DISCONTINUED | OUTPATIENT
Start: 2020-08-14 | End: 2020-08-15

## 2020-08-14 RX ORDER — TRAMADOL HYDROCHLORIDE 50 MG/1
100 TABLET ORAL EVERY 6 HOURS PRN
Status: DISCONTINUED | OUTPATIENT
Start: 2020-08-14 | End: 2020-08-15

## 2020-08-14 RX ORDER — TRAMADOL HYDROCHLORIDE 50 MG/1
TABLET ORAL EVERY 4 HOURS PRN
Qty: 20 TABLET | Refills: 0 | Status: SHIPPED | OUTPATIENT
Start: 2020-08-14 | End: 2020-09-01

## 2020-08-14 RX ORDER — DEXTROSE AND SODIUM CHLORIDE 5; .9 G/100ML; G/100ML
INJECTION, SOLUTION INTRAVENOUS CONTINUOUS
Status: DISCONTINUED | OUTPATIENT
Start: 2020-08-14 | End: 2020-08-15

## 2020-08-14 RX ORDER — CEFAZOLIN SODIUM/WATER 2 G/20 ML
SYRINGE (ML) INTRAVENOUS
Status: DISPENSED
Start: 2020-08-14 | End: 2020-08-14

## 2020-08-14 RX ORDER — LIDOCAINE HYDROCHLORIDE 10 MG/ML
INJECTION, SOLUTION EPIDURAL; INFILTRATION; INTRACAUDAL; PERINEURAL AS NEEDED
Status: DISCONTINUED | OUTPATIENT
Start: 2020-08-14 | End: 2020-08-14 | Stop reason: SURG

## 2020-08-14 RX ORDER — DIPHENHYDRAMINE HYDROCHLORIDE 50 MG/ML
12.5 INJECTION INTRAMUSCULAR; INTRAVENOUS AS NEEDED
Status: DISCONTINUED | OUTPATIENT
Start: 2020-08-14 | End: 2020-08-14 | Stop reason: HOSPADM

## 2020-08-14 RX ORDER — BUPROPION HYDROCHLORIDE 75 MG/1
150 TABLET ORAL DAILY
Status: DISCONTINUED | OUTPATIENT
Start: 2020-08-15 | End: 2020-08-14

## 2020-08-14 RX ORDER — BUPROPION HYDROCHLORIDE 150 MG/1
150 TABLET, EXTENDED RELEASE ORAL DAILY
Status: DISCONTINUED | OUTPATIENT
Start: 2020-08-15 | End: 2020-08-15

## 2020-08-14 RX ORDER — MEPERIDINE HYDROCHLORIDE 25 MG/ML
12.5 INJECTION INTRAMUSCULAR; INTRAVENOUS; SUBCUTANEOUS AS NEEDED
Status: DISCONTINUED | OUTPATIENT
Start: 2020-08-14 | End: 2020-08-14 | Stop reason: HOSPADM

## 2020-08-14 RX ORDER — HEPARIN SODIUM 5000 [USP'U]/ML
5000 INJECTION, SOLUTION INTRAVENOUS; SUBCUTANEOUS ONCE
Status: COMPLETED | OUTPATIENT
Start: 2020-08-14 | End: 2020-08-14

## 2020-08-14 RX ORDER — POLYETHYLENE GLYCOL 3350 17 G/17G
17 POWDER, FOR SOLUTION ORAL DAILY PRN
Status: DISCONTINUED | OUTPATIENT
Start: 2020-08-14 | End: 2020-08-15

## 2020-08-14 RX ADMIN — MIDAZOLAM HYDROCHLORIDE 2 MG: 1 INJECTION INTRAMUSCULAR; INTRAVENOUS at 10:10:00

## 2020-08-14 RX ADMIN — EPHEDRINE SULFATE 10 MG: 50 INJECTION, SOLUTION INTRAVENOUS at 11:08:00

## 2020-08-14 RX ADMIN — KETOROLAC TROMETHAMINE 30 MG: 30 INJECTION, SOLUTION INTRAMUSCULAR; INTRAVENOUS at 12:06:00

## 2020-08-14 RX ADMIN — CEFAZOLIN SODIUM/WATER 2 G: 2 G/20 ML SYRINGE (ML) INTRAVENOUS at 10:19:00

## 2020-08-14 RX ADMIN — DEXAMETHASONE SODIUM PHOSPHATE 8 MG: 4 MG/ML VIAL (ML) INJECTION at 10:26:00

## 2020-08-14 RX ADMIN — ROCURONIUM BROMIDE 20 MG: 10 INJECTION, SOLUTION INTRAVENOUS at 11:16:00

## 2020-08-14 RX ADMIN — ROCURONIUM BROMIDE 50 MG: 10 INJECTION, SOLUTION INTRAVENOUS at 10:14:00

## 2020-08-14 RX ADMIN — DROPERIDOL 0.62 MG: 2.5 INJECTION, SOLUTION INTRAMUSCULAR; INTRAVENOUS at 12:12:00

## 2020-08-14 RX ADMIN — EPHEDRINE SULFATE 5 MG: 50 INJECTION, SOLUTION INTRAVENOUS at 10:45:00

## 2020-08-14 RX ADMIN — SODIUM CHLORIDE, SODIUM LACTATE, POTASSIUM CHLORIDE, CALCIUM CHLORIDE: 600; 310; 30; 20 INJECTION, SOLUTION INTRAVENOUS at 10:44:00

## 2020-08-14 RX ADMIN — SODIUM CHLORIDE, SODIUM LACTATE, POTASSIUM CHLORIDE, CALCIUM CHLORIDE: 600; 310; 30; 20 INJECTION, SOLUTION INTRAVENOUS at 12:22:00

## 2020-08-14 RX ADMIN — LIDOCAINE HYDROCHLORIDE 50 MG: 10 INJECTION, SOLUTION EPIDURAL; INFILTRATION; INTRACAUDAL; PERINEURAL at 10:14:00

## 2020-08-14 RX ADMIN — EPHEDRINE SULFATE 5 MG: 50 INJECTION, SOLUTION INTRAVENOUS at 10:46:00

## 2020-08-14 NOTE — INTERVAL H&P NOTE
Pre-op Diagnosis: Pelvic pain in female [R10.2]  Enlarged uterus [N85.2]    The above referenced H&P was reviewed by Rosaura Subramanian MD on 8/14/2020, the patient was examined. She had a breast biopsy on the left side a few days ago.  She is somewhat sore a

## 2020-08-14 NOTE — ANESTHESIA POSTPROCEDURE EVALUATION
[address] [place] [address] Patient Status:  Outpatient in a Bed   Age/Gender 50year old female MRN [id number]   Location [address] [facility] Attending Don Escobar MD   Hosp Day # 0 PCP Galilea Flores 6

## 2020-08-14 NOTE — TELEPHONE ENCOUNTER
Telephoned Catia Mariano and name,  verified with patient. Notified Catia Mariano of negative for malignancy left breast  biopsy result. [place] verified by radiologist, Dr. Himanshu Galan. Catia Mariano reports biopsy site is healing well.  Hematoma sania

## 2020-08-14 NOTE — ANESTHESIA PREPROCEDURE EVALUATION
PRE-OP EVALUATION    Patient Name: Emily Preciado    Pre-op Diagnosis: Pelvic pain in female [R10.2]  Enlarged uterus [N85.2]    Procedure(s):  Robotic assisted total laparoscopic hysterectomy, cystoscopy, possible laparotomy    Surgeon(s) and Role:     * Neuro/Psych    Negative neuro/psych ROS.                           As per Epic:  Patient Active Problem List:     Hypothyroid     Wrist pain     Lateral epicondylitis  of elbow     Thumb pain     Late effect of sprain and strain without mention of ten • US BREAST BIOPSY Left 08/12/2020     Social History    Tobacco Use      Smoking status: Never Smoker      Smokeless tobacco: Never Used    Alcohol use: Yes      Frequency: 4 or more times a week      Drinks per session: 1 or 2      Binge frequency: Nev risks as described. All questions were answered. A desire to proceed with regional anesthesia for post operative analgesia has been demonstrated with an understanding of risks of adverse outcomes. Possible TAP block for post op analgesia, D/W patient.

## 2020-08-14 NOTE — PLAN OF CARE
Pt arrived on floor via bed from PACU. Sleepy, arousable, denies any nausea, mild pressure pain on incisions.  at bedside. Pt started on clear liquids.

## 2020-08-14 NOTE — ANESTHESIA PROCEDURE NOTES
Airway  Date/Time: 8/14/2020 10:16 AM  Urgency: elective    Airway not difficult    General Information and Staff    Patient location during procedure: OR  Anesthesiologist: Ingrid Casas MD  Resident/CRNA: Eros Queen CRNA  Performed: BILL Jesus

## 2020-08-14 NOTE — OPERATIVE REPORT
[facility]  Operative Note    David Dalal Patient Status:  Outpatient in a Bed    1965 MRN [id number]   Location [facility] UNIT Attending Shahrzad Bartlett MD   Hosp Day # 0 PCP HONG Vasquez     Date o prepped and draped in the usual fashion and cazares catheter was placed. Speculum was placed in the vagina and the cervix was grasped with a sharp tooth tenaculum. The uterus was sounded to 6-7 cm. The uterus was dilated using Hegar dilators up to 8 mm. The vaginal cuff was noted to be hemostatic following irrigation. The robot was undocked. Cystoscopy was performed. Both ureters were identified and ureteral jets were seen. The bladder appeared normal without sutures visualized.      The robotic ports

## 2020-08-15 VITALS
WEIGHT: 130.06 LBS | HEART RATE: 51 BPM | BODY MASS INDEX: 25.53 KG/M2 | HEIGHT: 60 IN | TEMPERATURE: 98 F | OXYGEN SATURATION: 99 % | RESPIRATION RATE: 16 BRPM | SYSTOLIC BLOOD PRESSURE: 127 MMHG | DIASTOLIC BLOOD PRESSURE: 88 MMHG

## 2020-08-15 LAB
BASOPHILS # BLD AUTO: 0.03 X10(3) UL (ref 0–0.2)
BASOPHILS NFR BLD AUTO: 0.3 %
DEPRECATED RDW RBC AUTO: 42 FL (ref 35.1–46.3)
EOSINOPHIL # BLD AUTO: 0.01 X10(3) UL (ref 0–0.7)
EOSINOPHIL NFR BLD AUTO: 0.1 %
ERYTHROCYTE [DISTWIDTH] IN BLOOD BY AUTOMATED COUNT: 12 % (ref 11–15)
HCT VFR BLD AUTO: 34.4 % (ref 35–48)
HGB BLD-MCNC: 11.6 G/DL (ref 12–16)
IMM GRANULOCYTES # BLD AUTO: 0.03 X10(3) UL (ref 0–1)
IMM GRANULOCYTES NFR BLD: 0.3 %
LYMPHOCYTES # BLD AUTO: 1.53 X10(3) UL (ref 1–4)
LYMPHOCYTES NFR BLD AUTO: 16.9 %
MCH RBC QN AUTO: 32 PG (ref 26–34)
MCHC RBC AUTO-ENTMCNC: 33.7 G/DL (ref 31–37)
MCV RBC AUTO: 94.8 FL (ref 80–100)
MONOCYTES # BLD AUTO: 0.76 X10(3) UL (ref 0.1–1)
MONOCYTES NFR BLD AUTO: 8.4 %
NEUTROPHILS # BLD AUTO: 6.68 X10 (3) UL (ref 1.5–7.7)
NEUTROPHILS # BLD AUTO: 6.68 X10(3) UL (ref 1.5–7.7)
NEUTROPHILS NFR BLD AUTO: 74 %
PLATELET # BLD AUTO: 252 10(3)UL (ref 150–450)
RBC # BLD AUTO: 3.63 X10(6)UL (ref 3.8–5.3)
WBC # BLD AUTO: 9 X10(3) UL (ref 4–11)

## 2020-08-15 RX ORDER — TRAMADOL HYDROCHLORIDE 50 MG/1
TABLET ORAL EVERY 4 HOURS PRN
Qty: 20 TABLET | Refills: 1 | Status: SHIPPED | OUTPATIENT
Start: 2020-08-15 | End: 2020-09-01

## 2020-08-15 RX ORDER — LEVOTHYROXINE SODIUM 0.05 MG/1
50 TABLET ORAL
Qty: 30 TABLET | Refills: 3 | Status: SHIPPED | OUTPATIENT
Start: 2020-08-16

## 2020-08-15 NOTE — PLAN OF CARE
Patient is alert and oriented x3  Up ad ifrah  Void s/p cazares removal; adequate amounts  Tolerating regular diet; denies nausea/vomiting  Incisions to ABD are CDI  Scant amount of drainage on peripad.      Patient assessed and ready for DC home  DC instructio

## 2020-08-15 NOTE — PROGRESS NOTES
POD #1 Temp: 98 °F (36.7 °C)  Pulse: 63  Resp: 16  BP: 115/68   Recent Labs   Lab 08/15/20  0522   RBC 3.63*   HGB 11.6*   HCT 34.4*   MCV 94.8   MCH 32.0   MCHC 33.7   RDW 12.0   NEPRELIM 6.68   WBC 9.0   .0     abd soft + bs  + flatus   Incisions

## 2020-08-21 NOTE — PROGRESS NOTES
Patient aware of results and recommendations. Follow up mammogram LEFT breast in 6 months recommended by radiologist. Order placed in system. Patient verbalized understanding.

## 2020-09-01 ENCOUNTER — OFFICE VISIT (OUTPATIENT)
Dept: OBGYN CLINIC | Facility: CLINIC | Age: 55
End: 2020-09-01
Payer: COMMERCIAL

## 2020-09-01 VITALS
BODY MASS INDEX: 23.79 KG/M2 | SYSTOLIC BLOOD PRESSURE: 100 MMHG | HEIGHT: 61 IN | HEART RATE: 63 BPM | WEIGHT: 126 LBS | DIASTOLIC BLOOD PRESSURE: 62 MMHG

## 2020-09-01 DIAGNOSIS — Z48.89 POSTOPERATIVE VISIT: Primary | ICD-10-CM

## 2020-09-01 DIAGNOSIS — Z98.890 STATUS POST CYSTOSCOPY: ICD-10-CM

## 2020-09-01 DIAGNOSIS — K52.832 LYMPHOCYTIC COLITIS: ICD-10-CM

## 2020-09-01 DIAGNOSIS — M62.89 PELVIC FLOOR DYSFUNCTION: ICD-10-CM

## 2020-09-01 DIAGNOSIS — R10.2 PELVIC PAIN: ICD-10-CM

## 2020-09-01 DIAGNOSIS — Z90.710 STATUS POST LAPAROSCOPIC HYSTERECTOMY: ICD-10-CM

## 2020-09-01 DIAGNOSIS — N64.89 HEMATOMA OF LEFT BREAST: ICD-10-CM

## 2020-09-01 PROBLEM — N85.2 ENLARGED UTERUS: Status: RESOLVED | Noted: 2020-07-16 | Resolved: 2020-09-01

## 2020-09-01 PROCEDURE — 3078F DIAST BP <80 MM HG: CPT | Performed by: OBSTETRICS & GYNECOLOGY

## 2020-09-01 PROCEDURE — 3074F SYST BP LT 130 MM HG: CPT | Performed by: OBSTETRICS & GYNECOLOGY

## 2020-09-01 PROCEDURE — 3008F BODY MASS INDEX DOCD: CPT | Performed by: OBSTETRICS & GYNECOLOGY

## 2020-09-01 PROCEDURE — 99024 POSTOP FOLLOW-UP VISIT: CPT | Performed by: OBSTETRICS & GYNECOLOGY

## 2020-09-01 RX ORDER — CYCLOBENZAPRINE HCL 5 MG
5 TABLET ORAL 3 TIMES DAILY PRN
Qty: 30 TABLET | Refills: 0 | Status: SHIPPED | OUTPATIENT
Start: 2020-09-01 | End: 2020-09-29

## 2020-09-01 NOTE — PROGRESS NOTES
[facility]  Obstetrics and Gynecology     Chief complaint: Patient presents with:  Gyn Problem: f/u surgery, had biopsy day before surgery and still very bruised    Subjective:     HPI: Elmira Stern is a 50year old [id number] with LMP No LMP rec normal c-scope. PCP: Jeff Boo    Review of Systems   Constitutional: Negative for fever. Respiratory: Negative. Cardiovascular: Negative. Gastrointestinal: Positive for constipation.  Negative for nausea, diarrhea, blood in sto Ergocalciferol (VITAMIN D OR), Take by mouth daily. , Disp: , Rfl:   Ascorbic Acid (VITAMIN C OR), Take by mouth daily. , Disp: , Rfl:     No current facility-administered medications on file prior to visit.        All:    Anesthetic [Benzoca*    NAUSEA History:   Procedure Laterality Date   • COLONOSCOPY N/A 10/8/2014    Performed by Burgess Herbert MD at [facility] ENDOSCOPY   • COLPOSCOPY,BX CERVIX/ENDOCERV CURR  2017    Biopsy in the office per patient.     • CYTOLOGY FINE NEEDLE  06/27/2013    [facility] LEFT thyroi file    Social Needs      Financial resource strain: Not on file      Food insecurity:        Worry: Not on file        Inability: Not on file      Transportation needs:        Medical: Not on file        Non-medical: Not on file    Tobacco Use      Isaiasin 23.81 kg/m². Physical Exam:  Physical Exam   Nursing note and vitals reviewed. Constitutional: She is oriented to person, place, and time. She appears well-developed and well-nourished.    HENT:   Head: Normocephalic and atraumatic.   +Facemask   Eyes: 108 08/08/2020    CO2 28.0 08/08/2020       Lab Results   Component Value Date    CHOLEST 190 09/04/2014    HDL 80 09/04/2014    LDL 98 09/04/2014    VLDL 12 09/04/2014        Lab Results   Component Value Date    T4F 1.41 09/04/2014    TSH 0.050 (L) 09/04 MG JAG, [facility] SOY 2D+3D DIAGNOSTIC [facility]  BILAT (SEZ=[id number]), 8/03/2020, 10:01 AM.  TECHNIQUE:  Breast ultrasound was performed, evaluating specific areas of concern.    FINDINGS:  **PLEASE SEE REPORT FOR DIAGNOSTIC MAMMOGRAM**      Dictated by (CST some urinary urgency which is improving. Continue stool softeners  Rx Flexeril  Pelvic floor PT advised     Perimenopausal. Ovaries retained. Colonoscopy - up to date. H/o lymphocytic colitis. Left breast biopsy negative 8/12/2020. +Hematoma.    -T

## 2020-09-14 ENCOUNTER — TELEPHONE (OUTPATIENT)
Dept: PHYSICAL THERAPY | Age: 55
End: 2020-09-14

## 2020-09-17 ENCOUNTER — OFFICE VISIT (OUTPATIENT)
Dept: PHYSICAL THERAPY | Age: 55
End: 2020-09-17
Attending: OBSTETRICS & GYNECOLOGY
Payer: COMMERCIAL

## 2020-09-17 PROCEDURE — 97110 THERAPEUTIC EXERCISES: CPT

## 2020-09-17 PROCEDURE — 97161 PT EVAL LOW COMPLEX 20 MIN: CPT

## 2020-09-18 NOTE — PROGRESS NOTES
MUSCULOSKELETAL AND PELVIC FLOOR EVALUATION:   Referring Physician: Dr. Harman Grove  Diagnosis: post op- partial hysterectomy 8/14     Stress incontinence Date of Service: 9/18/2020     PATIENT SUMMARY   Linda Smalls is a 50year old female  who present 04/16/2012   • Insomnia 2020   • Late effect of fracture of upper extremities 6/20/2013    Log Date: 05/03/2013    • Lymphocytic colitis 07/2016    Lymphocytic colitis   • Nontoxic multinodular goiter 6/25/2013   • Ovarian cyst    • Pap smear for cervical evaluation with primary c/o post op partial hysterectomy- weak PF . The results of the objective tests and measures show decreased core and pelvic floor strength. Functional deficits include but are not limited to sneezing, coughing without leakage.   Sign incontinence  PLAN OF CARE:    Goals: (to be met in 8 visits)  1. Pt to be I with HEP  2. Pt to demonstrate 10 sec holds at 10 mv on the biofeedback to decrease SI  3. Pt to demonstrate 10 fast contractions in 10 secs to decrease SI  4.  Pt  To report ambul

## 2020-09-22 ENCOUNTER — OFFICE VISIT (OUTPATIENT)
Dept: PHYSICAL THERAPY | Age: 55
End: 2020-09-22
Attending: FAMILY MEDICINE
Payer: COMMERCIAL

## 2020-09-22 PROCEDURE — 97110 THERAPEUTIC EXERCISES: CPT

## 2020-09-22 NOTE — PROGRESS NOTES
Dx:  post op- partial hysterectomy 8/14     Stress incontinence       Insurance (Authorized # of Visits):   8         Authorizing Physician: Dr. Reyes ref.  provider found  Next MD visit: none scheduled  Fall Risk: standard         Precautions: n/a

## 2020-09-25 ENCOUNTER — APPOINTMENT (OUTPATIENT)
Dept: PHYSICAL THERAPY | Age: 55
End: 2020-09-25
Attending: FAMILY MEDICINE
Payer: COMMERCIAL

## 2020-09-28 NOTE — PROGRESS NOTES
[facility]  Obstetrics and Gynecology     Chief complaint: Patient presents with:  Post-Op: 6 wk f/u, doing well    Subjective:     HPI: Janneth Dang is a 50year old [id number] here for 6 wk postop visit.      8/14/2020 - S/p robotic-assisted tot OB History:  OB History     T6    L6    SAB4  TAB0  Ectopic0  Multiple0  Live Births6   OB History    Para Term  AB Living   10 6 6 0 4 6   SAB TAB Ectopic Multiple Live Births   4 0 0 0 6      # Outcome Date GA Lbr Amos/2nd 2020   • ASCUS of cervix with negative high risk HPV 04/12/2017    [facility] system.     • Carpal tunnel syndrome 6/20/2013    Log Date: 05/01/2013    • Colitis    • Disorder of thyroid    • Enlarged uterus     Some imaging says \"possible fibroids\" but nothing laparoscopic hysterectomy, cystoscopy - Path: Adenomyosis.  Dr. Robertson Never   • [facility]  03/14/2018    Hysteroscopy, dilation and curettage, hydrothermal ablation, laparoscopic bilateral salpingectomy   • [facility]    Lifestyle      Physical activity        Days per week: Not on file        Minutes per session: Not on file      Stress: Not on file    Relationships      Social connections        Talks on phone: Not on file        Gets together: No consistent with hematoma. Non-fluctuant. Only slightly tender    Abdominal: Soft. She exhibits distension. She exhibits no mass. There is no abdominal tenderness. There is no rebound and no guarding. Abdomen mildly distended & diffusely tympanitic.  No gu

## 2020-09-29 ENCOUNTER — OFFICE VISIT (OUTPATIENT)
Dept: OBGYN CLINIC | Facility: CLINIC | Age: 55
End: 2020-09-29
Payer: COMMERCIAL

## 2020-09-29 ENCOUNTER — OFFICE VISIT (OUTPATIENT)
Dept: PHYSICAL THERAPY | Age: 55
End: 2020-09-29
Attending: FAMILY MEDICINE
Payer: COMMERCIAL

## 2020-09-29 VITALS
HEART RATE: 84 BPM | DIASTOLIC BLOOD PRESSURE: 62 MMHG | HEIGHT: 61 IN | BODY MASS INDEX: 22.84 KG/M2 | SYSTOLIC BLOOD PRESSURE: 102 MMHG | WEIGHT: 121 LBS

## 2020-09-29 DIAGNOSIS — Z48.89 POSTOPERATIVE VISIT: Primary | ICD-10-CM

## 2020-09-29 DIAGNOSIS — M62.89 PELVIC FLOOR DYSFUNCTION: ICD-10-CM

## 2020-09-29 DIAGNOSIS — Z28.21 INFLUENZA VACCINATION DECLINED: ICD-10-CM

## 2020-09-29 DIAGNOSIS — Z90.710 STATUS POST LAPAROSCOPIC HYSTERECTOMY: ICD-10-CM

## 2020-09-29 DIAGNOSIS — Z98.890 STATUS POST CYSTOSCOPY: ICD-10-CM

## 2020-09-29 PROCEDURE — 3078F DIAST BP <80 MM HG: CPT | Performed by: OBSTETRICS & GYNECOLOGY

## 2020-09-29 PROCEDURE — 3074F SYST BP LT 130 MM HG: CPT | Performed by: OBSTETRICS & GYNECOLOGY

## 2020-09-29 PROCEDURE — 97110 THERAPEUTIC EXERCISES: CPT

## 2020-09-29 PROCEDURE — 99024 POSTOP FOLLOW-UP VISIT: CPT | Performed by: OBSTETRICS & GYNECOLOGY

## 2020-09-29 PROCEDURE — 3008F BODY MASS INDEX DOCD: CPT | Performed by: OBSTETRICS & GYNECOLOGY

## 2020-09-29 NOTE — PROGRESS NOTES
Dx:  post op- partial hysterectomy 8/14     Stress incontinence       Insurance (Authorized # of Visits):   8         Authorizing Physician: Dr. Reyes ref.  provider found  Next MD visit: none scheduled  Fall Risk: standard         Precautions: n/a for home                            HEP:  Clams and bridges    Charges: EX 3        Total Timed Treatment: 45 min  Total Treatment Time: 45 min

## 2020-09-30 ENCOUNTER — APPOINTMENT (OUTPATIENT)
Dept: PHYSICAL THERAPY | Age: 55
End: 2020-09-30
Payer: COMMERCIAL

## 2020-10-02 ENCOUNTER — APPOINTMENT (OUTPATIENT)
Dept: PHYSICAL THERAPY | Age: 55
End: 2020-10-02
Attending: FAMILY MEDICINE
Payer: COMMERCIAL

## 2020-10-06 ENCOUNTER — OFFICE VISIT (OUTPATIENT)
Dept: PHYSICAL THERAPY | Age: 55
End: 2020-10-06
Attending: FAMILY MEDICINE
Payer: COMMERCIAL

## 2020-10-06 PROCEDURE — 97110 THERAPEUTIC EXERCISES: CPT

## 2020-10-06 NOTE — PROGRESS NOTES
Dx:  post op- partial hysterectomy 8/14     Stress incontinence       Insurance (Authorized # of Visits):   8         Authorizing Physician: Dr. Reyes ref.  provider found  Next MD visit: none scheduled  Fall Risk: standard         Precautions: n/a 10 mins   Cues for breathing- techs for home  Prone: cervical and thoracic spine mobs uni and central   Attempted Long sitting CT junction 10 mins    Occipital release 5 mins     Cupping R low back 5 mins  Roller thoracic spine for ext on table 5 mins

## 2020-10-09 ENCOUNTER — APPOINTMENT (OUTPATIENT)
Dept: PHYSICAL THERAPY | Age: 55
End: 2020-10-09
Attending: FAMILY MEDICINE
Payer: COMMERCIAL

## 2020-10-13 ENCOUNTER — OFFICE VISIT (OUTPATIENT)
Dept: PHYSICAL THERAPY | Age: 55
End: 2020-10-13
Attending: FAMILY MEDICINE
Payer: COMMERCIAL

## 2020-10-13 PROCEDURE — 97110 THERAPEUTIC EXERCISES: CPT

## 2020-10-13 NOTE — PROGRESS NOTES
Dx:  post op- partial hysterectomy 8/14     Stress incontinence       Insurance (Authorized # of Visits):   8         Authorizing Physician: Dr. Caro Wilkinson MD visit: none scheduled  Fall Risk: standard         Precautions: n/a             Sub mobs uni and central   Attempted Long sitting CT junction 10 mins    Occipital release 5 mins     Cupping R low back 5 mins  Roller thoracic spine for ext on table 5 mins  Bent leg fall out   Fast contractions  Contractions for 10 sec holds     Ball over h

## 2020-10-16 ENCOUNTER — APPOINTMENT (OUTPATIENT)
Dept: PHYSICAL THERAPY | Age: 55
End: 2020-10-16
Attending: FAMILY MEDICINE
Payer: COMMERCIAL

## 2020-10-20 ENCOUNTER — OFFICE VISIT (OUTPATIENT)
Dept: PHYSICAL THERAPY | Age: 55
End: 2020-10-20
Attending: FAMILY MEDICINE
Payer: COMMERCIAL

## 2020-10-20 PROCEDURE — 97110 THERAPEUTIC EXERCISES: CPT

## 2020-10-20 NOTE — PROGRESS NOTES
Dx:  post op- partial hysterectomy 8/14     Stress incontinence       Insurance (Authorized # of Visits):   8         Authorizing Physician: Dr. Mardene Goodpasture  Next MD visit: none scheduled  Fall Risk: standard         Precautions: n/a             Sub thoracic spine mobs uni and central   Attempted Long sitting CT junction 10 mins    Occipital release 5 mins     Cupping R low back 5 mins  Roller thoracic spine for ext on table 5 mins  Bent leg fall out   Fast contractions  Contractions for 10 sec holds

## 2020-10-23 ENCOUNTER — APPOINTMENT (OUTPATIENT)
Dept: PHYSICAL THERAPY | Age: 55
End: 2020-10-23
Attending: FAMILY MEDICINE
Payer: COMMERCIAL

## 2020-10-27 ENCOUNTER — OFFICE VISIT (OUTPATIENT)
Dept: PHYSICAL THERAPY | Age: 55
End: 2020-10-27
Attending: FAMILY MEDICINE
Payer: COMMERCIAL

## 2020-10-27 PROCEDURE — 97110 THERAPEUTIC EXERCISES: CPT

## 2020-10-27 NOTE — PROGRESS NOTES
Dx:  post op- partial hysterectomy 8/14     Stress incontinence       Insurance (Authorized # of Visits):   8         Authorizing Physician: Dr. Reyes ref.  provider found  Next MD visit: none scheduled  Fall Risk: standard         Precautions: n/a mins  CT junction x 2 in long sitting  Rows gTB 10 x 2   Ext over the foam roller 5 mins   Clams with GTB 10 x 2  With PF contractions    Bridge 10 x 2 with PF contractions  biofeedback   Contractions and fast contractions 10 x each   Bent leg fall outs 10

## 2020-10-30 ENCOUNTER — APPOINTMENT (OUTPATIENT)
Dept: PHYSICAL THERAPY | Age: 55
End: 2020-10-30
Attending: FAMILY MEDICINE
Payer: COMMERCIAL

## 2020-11-03 ENCOUNTER — OFFICE VISIT (OUTPATIENT)
Dept: PHYSICAL THERAPY | Age: 55
End: 2020-11-03
Attending: FAMILY MEDICINE
Payer: COMMERCIAL

## 2020-11-03 PROCEDURE — 97110 THERAPEUTIC EXERCISES: CPT

## 2020-11-03 NOTE — PROGRESS NOTES
Dx:  post op- partial hysterectomy 8/14     Stress incontinence       Insurance (Authorized # of Visits):   8         Authorizing Physician: Dr. Reyes ref.  provider found  Next MD visit: none scheduled  Fall Risk: standard         Precautions: n/a uni and central   Attempted Long sitting CT junction 10 mins    Occipital release 5 mins     Cupping R low back 5 mins  Roller thoracic spine for ext on table 5 mins  Bent leg fall out   Fast contractions  Contractions for 10 sec holds     Ball over head w

## 2020-11-06 ENCOUNTER — APPOINTMENT (OUTPATIENT)
Dept: PHYSICAL THERAPY | Age: 55
End: 2020-11-06
Attending: FAMILY MEDICINE
Payer: COMMERCIAL

## 2020-11-10 ENCOUNTER — APPOINTMENT (OUTPATIENT)
Dept: PHYSICAL THERAPY | Age: 55
End: 2020-11-10
Attending: FAMILY MEDICINE
Payer: COMMERCIAL

## 2021-11-17 ENCOUNTER — HOSPITAL ENCOUNTER (EMERGENCY)
Facility: HOSPITAL | Age: 56
Discharge: LEFT BEFORE DISCHARGE | End: 2021-11-17
Payer: COMMERCIAL

## 2021-11-17 PROCEDURE — 93005 ELECTROCARDIOGRAM TRACING: CPT

## 2022-01-13 ENCOUNTER — OFFICE VISIT (OUTPATIENT)
Dept: OBGYN CLINIC | Facility: CLINIC | Age: 57
End: 2022-01-13
Payer: COMMERCIAL

## 2022-01-13 VITALS
HEIGHT: 61 IN | DIASTOLIC BLOOD PRESSURE: 70 MMHG | WEIGHT: 112.19 LBS | SYSTOLIC BLOOD PRESSURE: 110 MMHG | HEART RATE: 91 BPM | BODY MASS INDEX: 21.18 KG/M2

## 2022-01-13 DIAGNOSIS — Z98.890 HISTORY OF LEFT BREAST BIOPSY: ICD-10-CM

## 2022-01-13 DIAGNOSIS — Z12.39 SCREENING BREAST EXAMINATION: ICD-10-CM

## 2022-01-13 DIAGNOSIS — N81.11 CYSTOCELE, MIDLINE: ICD-10-CM

## 2022-01-13 DIAGNOSIS — N89.8 VAGINAL ITCHING: ICD-10-CM

## 2022-01-13 DIAGNOSIS — N95.2 VAGINAL ATROPHY: ICD-10-CM

## 2022-01-13 DIAGNOSIS — Z12.31 ENCOUNTER FOR SCREENING MAMMOGRAM FOR MALIGNANT NEOPLASM OF BREAST: ICD-10-CM

## 2022-01-13 DIAGNOSIS — Z90.710 STATUS POST LAPAROSCOPIC HYSTERECTOMY: ICD-10-CM

## 2022-01-13 DIAGNOSIS — Z01.411 ENCOUNTER FOR WELL WOMAN EXAM WITH ABNORMAL FINDINGS: Primary | ICD-10-CM

## 2022-01-13 PROCEDURE — 3008F BODY MASS INDEX DOCD: CPT | Performed by: OBSTETRICS & GYNECOLOGY

## 2022-01-13 PROCEDURE — 99396 PREV VISIT EST AGE 40-64: CPT | Performed by: OBSTETRICS & GYNECOLOGY

## 2022-01-13 PROCEDURE — 3074F SYST BP LT 130 MM HG: CPT | Performed by: OBSTETRICS & GYNECOLOGY

## 2022-01-13 PROCEDURE — 87510 GARDNER VAG DNA DIR PROBE: CPT | Performed by: OBSTETRICS & GYNECOLOGY

## 2022-01-13 PROCEDURE — 3078F DIAST BP <80 MM HG: CPT | Performed by: OBSTETRICS & GYNECOLOGY

## 2022-01-13 PROCEDURE — 99214 OFFICE O/P EST MOD 30 MIN: CPT | Performed by: OBSTETRICS & GYNECOLOGY

## 2022-01-13 PROCEDURE — 87480 CANDIDA DNA DIR PROBE: CPT | Performed by: OBSTETRICS & GYNECOLOGY

## 2022-01-13 PROCEDURE — 87660 TRICHOMONAS VAGIN DIR PROBE: CPT | Performed by: OBSTETRICS & GYNECOLOGY

## 2022-01-13 NOTE — H&P
[facility]  Obstetrics and Gynecology   H&P  Established    Chief complaint: Patient presents with:  Wellness Visit  Gyn Problem: Discuss menopause    Subjective:     HPI: Jimena Muniz is a 49year old [id number]   Here for [employer].  Discuss m were bad but have subsided. Genitourinary: Positive for bladder incontinence, vaginal pain, dyspareunia and hot flashes.  Negative for dysuria, urgency, frequency, vaginal bleeding, vaginal discharge, menstrual problem, pelvic pain, breast mass and breas Disp: 30 tablet, Rfl: 3  temazepam 7.5 MG Oral Cap, Take 7.5 mg by mouth nightly as needed for Sleep., Disp: , Rfl:   lisinopril 10 MG Oral Tab, Take 10 mg by mouth daily.   , Disp: , Rfl:   Albuterol Sulfate  (90 Base) MCG/ACT Inhalation Aero Soln, smear for cervical cancer screening 07/28/2020    Pap & HPV negative. • PONV (postoperative nausea and vomiting)     uses scop patche 1 day before & at least 1 week after.  BP drop with epidural   • PTSD (post-traumatic stress disorder) 2020   • Screenin HERNIA REPAIR     • US BREAST BIOPSY Left 08/12/2020    US-guided LEFT breast biopsy - BENIGN - fibrocystic changes including microcyst formation, dense fibrosis and focal secretory change.  Complicated by large hematoma (approximately 6 cm or so)        So kg/m². Physical Exam:  Physical Exam   Nursing note and vitals reviewed. Constitutional: She is oriented to person, place, and time. She appears well-developed and well-nourished.    HENT:   Head: Normocephalic and atraumatic.   +Facemask   Eyes: Conju 2D+3D SCREENING BILAT (CPT=77067/83326); Future    Status post laparoscopic hysterectomy    History of left breast biopsy  -     [facility] SOY 2D+3D SCREENING BILAT (CPT=77067/78703);  Future    Cystocele, midline  -     OP REFERRAL TO UROGYNECOLOGY CLINIC    [place]

## 2022-01-14 PROBLEM — Z98.890 HISTORY OF LEFT BREAST BIOPSY: Status: ACTIVE | Noted: 2022-01-14

## 2022-01-14 PROBLEM — N81.11 CYSTOCELE, MIDLINE: Status: ACTIVE | Noted: 2022-01-14

## 2022-01-19 NOTE — PROGRESS NOTES
Pt aware of results & recommendations for Metrogel. Discussed other recommendations for boric acid or Clairvee. She will read [employer] message and call with any questions. Metrogel pended. Verbalized understanding.

## 2022-02-03 ENCOUNTER — HOSPITAL ENCOUNTER (OUTPATIENT)
Dept: MAMMOGRAPHY | Age: 57
Discharge: HOME | End: 2022-02-03
Attending: OBSTETRICS & GYNECOLOGY
Payer: COMMERCIAL

## 2022-02-03 DIAGNOSIS — Z98.890 HISTORY OF LEFT BREAST BIOPSY: ICD-10-CM

## 2022-02-03 DIAGNOSIS — Z12.31 ENCOUNTER FOR SCREENING MAMMOGRAM FOR MALIGNANT NEOPLASM OF BREAST: ICD-10-CM

## 2022-02-03 PROCEDURE — 77063 BREAST TOMOSYNTHESIS BI: CPT | Performed by: OBSTETRICS & GYNECOLOGY

## 2022-02-03 PROCEDURE — 77067 SCR MAMMO BI INCL CAD: CPT | Performed by: OBSTETRICS & GYNECOLOGY

## 2022-02-08 ENCOUNTER — TELEPHONE (OUTPATIENT)
Dept: OBGYN CLINIC | Facility: CLINIC | Age: 57
End: 2022-02-08

## 2022-02-08 NOTE — TELEPHONE ENCOUNTER
Called for mammo result, once reviewed by provider will call pt with recommendation, route to MM verb. Understanding.

## 2022-02-09 NOTE — TELEPHONE ENCOUNTER
Per Cheryl Mckeon, looks like it was an Epic error that results did not cross over. Will be faxing over report and placing ticket with help desk to ensure result is visible to us.

## 2022-02-09 NOTE — TELEPHONE ENCOUNTER
mammo abstracted and placed in provider bin for review. Pt aware of negative results and radiology recommendation for additional screening due to dense breasts. Information sent via [employer].

## 2022-02-14 NOTE — TELEPHONE ENCOUNTER
Spoke with patient, has not viewed message. Advised pt to look over message and contact insurance if interested in additional screening. Understanding verbalized.

## 2022-02-16 ENCOUNTER — OFFICE VISIT (OUTPATIENT)
Dept: UROLOGY | Facility: CLINIC | Age: 57
End: 2022-02-16
Attending: OBSTETRICS & GYNECOLOGY
Payer: COMMERCIAL

## 2022-02-16 VITALS — TEMPERATURE: 98 F | WEIGHT: 112 LBS | RESPIRATION RATE: 16 BRPM | BODY MASS INDEX: 21 KG/M2

## 2022-02-16 DIAGNOSIS — N39.46 MIXED STRESS AND URGE URINARY INCONTINENCE: ICD-10-CM

## 2022-02-16 DIAGNOSIS — N81.10 CYSTOCELE WITH RECTOCELE: ICD-10-CM

## 2022-02-16 DIAGNOSIS — M62.89 PELVIC FLOOR TENSION: Primary | ICD-10-CM

## 2022-02-16 DIAGNOSIS — N95.2 VAGINAL ATROPHY: ICD-10-CM

## 2022-02-16 DIAGNOSIS — N81.6 CYSTOCELE WITH RECTOCELE: ICD-10-CM

## 2022-02-16 PROCEDURE — 99212 OFFICE O/P EST SF 10 MIN: CPT

## 2022-03-08 ENCOUNTER — TELEPHONE (OUTPATIENT)
Dept: PHYSICAL THERAPY | Facility: HOSPITAL | Age: 57
End: 2022-03-08

## 2022-03-09 ENCOUNTER — OFFICE VISIT (OUTPATIENT)
Dept: PHYSICAL THERAPY | Facility: HOSPITAL | Age: 57
End: 2022-03-09
Attending: OBSTETRICS & GYNECOLOGY
Payer: COMMERCIAL

## 2022-03-09 DIAGNOSIS — N81.10 CYSTOCELE WITH RECTOCELE: ICD-10-CM

## 2022-03-09 DIAGNOSIS — N81.6 CYSTOCELE WITH RECTOCELE: ICD-10-CM

## 2022-03-09 DIAGNOSIS — M62.89 PELVIC FLOOR TENSION: ICD-10-CM

## 2022-03-09 PROCEDURE — 97140 MANUAL THERAPY 1/> REGIONS: CPT

## 2022-03-09 PROCEDURE — 97112 NEUROMUSCULAR REEDUCATION: CPT

## 2022-03-09 PROCEDURE — 97162 PT EVAL MOD COMPLEX 30 MIN: CPT
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
## 2022-03-16 ENCOUNTER — OFFICE VISIT (OUTPATIENT)
Dept: PHYSICAL THERAPY | Facility: HOSPITAL | Age: 57
End: 2022-03-16
Attending: FAMILY MEDICINE
Payer: COMMERCIAL

## 2022-03-16 ENCOUNTER — APPOINTMENT (OUTPATIENT)
Dept: PHYSICAL THERAPY | Facility: HOSPITAL | Age: 57
End: 2022-03-16
Attending: OBSTETRICS & GYNECOLOGY
Payer: COMMERCIAL

## 2022-03-16 PROCEDURE — 97112 NEUROMUSCULAR REEDUCATION: CPT

## 2022-03-16 PROCEDURE — 97140 MANUAL THERAPY 1/> REGIONS: CPT

## 2022-03-23 ENCOUNTER — APPOINTMENT (OUTPATIENT)
Dept: PHYSICAL THERAPY | Facility: HOSPITAL | Age: 57
End: 2022-03-23
Attending: OBSTETRICS & GYNECOLOGY
Payer: COMMERCIAL

## 2022-03-23 ENCOUNTER — OFFICE VISIT (OUTPATIENT)
Dept: PHYSICAL THERAPY | Facility: HOSPITAL | Age: 57
End: 2022-03-23
Attending: FAMILY MEDICINE
Payer: COMMERCIAL

## 2022-03-23 PROCEDURE — 97140 MANUAL THERAPY 1/> REGIONS: CPT

## 2022-03-23 PROCEDURE — 97112 NEUROMUSCULAR REEDUCATION: CPT

## 2022-03-30 ENCOUNTER — APPOINTMENT (OUTPATIENT)
Dept: PHYSICAL THERAPY | Facility: HOSPITAL | Age: 57
End: 2022-03-30
Attending: FAMILY MEDICINE
Payer: COMMERCIAL

## 2022-03-30 ENCOUNTER — APPOINTMENT (OUTPATIENT)
Dept: PHYSICAL THERAPY | Facility: HOSPITAL | Age: 57
End: 2022-03-30
Attending: OBSTETRICS & GYNECOLOGY
Payer: COMMERCIAL

## 2022-04-06 ENCOUNTER — OFFICE VISIT (OUTPATIENT)
Dept: PHYSICAL THERAPY | Facility: HOSPITAL | Age: 57
End: 2022-04-06
Attending: OBSTETRICS & GYNECOLOGY
Payer: COMMERCIAL

## 2022-04-06 DIAGNOSIS — M62.89 PELVIC FLOOR TENSION: ICD-10-CM

## 2022-04-06 DIAGNOSIS — N81.10 CYSTOCELE WITH RECTOCELE: ICD-10-CM

## 2022-04-06 DIAGNOSIS — N81.6 CYSTOCELE WITH RECTOCELE: ICD-10-CM

## 2022-04-06 PROCEDURE — 97140 MANUAL THERAPY 1/> REGIONS: CPT

## 2022-04-06 PROCEDURE — 97112 NEUROMUSCULAR REEDUCATION: CPT

## 2022-04-13 ENCOUNTER — OFFICE VISIT (OUTPATIENT)
Dept: PHYSICAL THERAPY | Facility: HOSPITAL | Age: 57
End: 2022-04-13
Attending: OBSTETRICS & GYNECOLOGY
Payer: COMMERCIAL

## 2022-04-13 DIAGNOSIS — M62.89 PELVIC FLOOR TENSION: ICD-10-CM

## 2022-04-13 DIAGNOSIS — N81.10 CYSTOCELE WITH RECTOCELE: ICD-10-CM

## 2022-04-13 DIAGNOSIS — N81.6 CYSTOCELE WITH RECTOCELE: ICD-10-CM

## 2022-04-13 PROCEDURE — 97110 THERAPEUTIC EXERCISES: CPT

## 2022-04-13 PROCEDURE — 97140 MANUAL THERAPY 1/> REGIONS: CPT

## 2022-04-20 ENCOUNTER — OFFICE VISIT (OUTPATIENT)
Dept: PHYSICAL THERAPY | Facility: HOSPITAL | Age: 57
End: 2022-04-20
Attending: OBSTETRICS & GYNECOLOGY
Payer: COMMERCIAL

## 2022-04-20 DIAGNOSIS — M62.89 PELVIC FLOOR TENSION: ICD-10-CM

## 2022-04-20 DIAGNOSIS — N81.6 CYSTOCELE WITH RECTOCELE: ICD-10-CM

## 2022-04-20 DIAGNOSIS — N81.10 CYSTOCELE WITH RECTOCELE: ICD-10-CM

## 2022-04-20 PROCEDURE — 97110 THERAPEUTIC EXERCISES: CPT

## 2022-04-20 PROCEDURE — 97112 NEUROMUSCULAR REEDUCATION: CPT

## 2022-04-27 ENCOUNTER — OFFICE VISIT (OUTPATIENT)
Dept: PHYSICAL THERAPY | Facility: HOSPITAL | Age: 57
End: 2022-04-27
Attending: OBSTETRICS & GYNECOLOGY
Payer: COMMERCIAL

## 2022-04-27 DIAGNOSIS — N81.10 CYSTOCELE WITH RECTOCELE: ICD-10-CM

## 2022-04-27 DIAGNOSIS — M62.89 PELVIC FLOOR TENSION: ICD-10-CM

## 2022-04-27 DIAGNOSIS — N81.6 CYSTOCELE WITH RECTOCELE: ICD-10-CM

## 2022-04-27 PROCEDURE — 97112 NEUROMUSCULAR REEDUCATION: CPT

## 2022-04-27 PROCEDURE — 97140 MANUAL THERAPY 1/> REGIONS: CPT

## 2022-05-04 ENCOUNTER — OFFICE VISIT (OUTPATIENT)
Dept: PHYSICAL THERAPY | Facility: HOSPITAL | Age: 57
End: 2022-05-04
Attending: OBSTETRICS & GYNECOLOGY
Payer: COMMERCIAL

## 2022-05-04 DIAGNOSIS — M62.89 PELVIC FLOOR TENSION: ICD-10-CM

## 2022-05-04 DIAGNOSIS — N81.6 CYSTOCELE WITH RECTOCELE: ICD-10-CM

## 2022-05-04 DIAGNOSIS — N81.10 CYSTOCELE WITH RECTOCELE: ICD-10-CM

## 2022-05-04 PROCEDURE — 97112 NEUROMUSCULAR REEDUCATION: CPT

## 2022-05-04 PROCEDURE — 97140 MANUAL THERAPY 1/> REGIONS: CPT

## 2022-05-11 ENCOUNTER — OFFICE VISIT (OUTPATIENT)
Dept: PHYSICAL THERAPY | Facility: HOSPITAL | Age: 57
End: 2022-05-11
Attending: OBSTETRICS & GYNECOLOGY
Payer: COMMERCIAL

## 2022-05-11 DIAGNOSIS — N81.10 CYSTOCELE WITH RECTOCELE: ICD-10-CM

## 2022-05-11 DIAGNOSIS — N81.6 CYSTOCELE WITH RECTOCELE: ICD-10-CM

## 2022-05-11 DIAGNOSIS — M62.89 PELVIC FLOOR TENSION: ICD-10-CM

## 2022-05-11 PROCEDURE — 97112 NEUROMUSCULAR REEDUCATION: CPT

## 2022-05-11 PROCEDURE — 97140 MANUAL THERAPY 1/> REGIONS: CPT

## 2022-05-25 ENCOUNTER — OFFICE VISIT (OUTPATIENT)
Dept: UROLOGY | Facility: CLINIC | Age: 57
End: 2022-05-25
Attending: OBSTETRICS & GYNECOLOGY
Payer: COMMERCIAL

## 2022-05-25 VITALS — WEIGHT: 112 LBS | BODY MASS INDEX: 21.14 KG/M2 | TEMPERATURE: 97 F | HEIGHT: 61 IN

## 2022-05-25 DIAGNOSIS — N95.2 VAGINAL ATROPHY: ICD-10-CM

## 2022-05-25 DIAGNOSIS — N81.6 CYSTOCELE WITH RECTOCELE: ICD-10-CM

## 2022-05-25 DIAGNOSIS — N81.10 CYSTOCELE WITH RECTOCELE: ICD-10-CM

## 2022-05-25 DIAGNOSIS — M62.89 PELVIC FLOOR TENSION: Primary | ICD-10-CM

## 2022-05-25 PROCEDURE — 99212 OFFICE O/P EST SF 10 MIN: CPT

## 2022-05-25 RX ORDER — ESTRADIOL 10 UG/1
10 INSERT VAGINAL
Qty: 24 TABLET | Refills: 3 | Status: SHIPPED | OUTPATIENT
Start: 2022-05-26 | End: 2022-06-25

## 2022-07-15 ENCOUNTER — PATIENT MESSAGE (OUTPATIENT)
Dept: OBGYN CLINIC | Facility: CLINIC | Age: 57
End: 2022-07-15

## 2022-07-15 NOTE — TELEPHONE ENCOUNTER
Spoke to patient, she states she has always had \"hard\" areas in both breast but feels it is expanding; is also noticing some tenderness bilaterally. Patient offered an appointment with gustavo HICKS. Scheduled 7/19/22.

## 2022-07-15 NOTE — TELEPHONE ENCOUNTER
From: Sergehifernando Race  To: Daryn Estrada MD  Sent: 7/15/2022 1:48 PM CDT  Subject: Test Results Question    I have a question about [facility] SOY 2D+3D SCREENING BILAT (CPT=77067/89171) resulted on 2/3/22, 11:35 AM.    I was sent letter about having a MBI I think I should have one . I have noticed a change and hardening of tissue ,when I lay on stomach I have pain in certain areas.

## 2022-07-19 ENCOUNTER — OFFICE VISIT (OUTPATIENT)
Dept: OBGYN CLINIC | Facility: CLINIC | Age: 57
End: 2022-07-19
Payer: COMMERCIAL

## 2022-07-19 VITALS
HEART RATE: 78 BPM | HEIGHT: 61 IN | WEIGHT: 114 LBS | DIASTOLIC BLOOD PRESSURE: 70 MMHG | SYSTOLIC BLOOD PRESSURE: 100 MMHG | BODY MASS INDEX: 21.52 KG/M2

## 2022-07-19 DIAGNOSIS — N63.0 PAINFUL LUMPY BREASTS: Primary | ICD-10-CM

## 2022-07-19 DIAGNOSIS — N64.4 PAINFUL LUMPY BREASTS: Primary | ICD-10-CM

## 2022-07-19 PROCEDURE — 3078F DIAST BP <80 MM HG: CPT

## 2022-07-19 PROCEDURE — 3074F SYST BP LT 130 MM HG: CPT

## 2022-07-19 PROCEDURE — 3008F BODY MASS INDEX DOCD: CPT

## 2022-07-19 PROCEDURE — 99213 OFFICE O/P EST LOW 20 MIN: CPT

## 2022-07-22 ENCOUNTER — HOSPITAL ENCOUNTER (OUTPATIENT)
Dept: MAMMOGRAPHY | Facility: HOSPITAL | Age: 57
Discharge: HOME | End: 2022-07-22
Payer: COMMERCIAL

## 2022-07-22 ENCOUNTER — TELEPHONE (OUTPATIENT)
Dept: OBGYN CLINIC | Facility: CLINIC | Age: 57
End: 2022-07-22

## 2022-07-22 DIAGNOSIS — N63.0 PAINFUL LUMPY BREASTS: ICD-10-CM

## 2022-07-22 DIAGNOSIS — N64.4 PAINFUL LUMPY BREASTS: ICD-10-CM

## 2022-07-22 DIAGNOSIS — N64.4 PAINFUL BREASTS: ICD-10-CM

## 2022-07-22 DIAGNOSIS — N63.0 MASS OF BREAST, UNSPECIFIED LATERALITY: Primary | ICD-10-CM

## 2022-07-22 PROCEDURE — 77066 DX MAMMO INCL CAD BI: CPT

## 2022-07-22 PROCEDURE — 76642 ULTRASOUND BREAST LIMITED: CPT

## 2022-07-22 PROCEDURE — 77062 BREAST TOMOSYNTHESIS BI: CPT

## 2022-07-22 NOTE — TELEPHONE ENCOUNTER
Order request for breast MRI received from radiology department. Order placed and routed to provider for signature.

## 2022-07-26 DIAGNOSIS — R92.8 ABNORMALITY OF BOTH BREASTS ON SCREENING MAMMOGRAM: Primary | ICD-10-CM

## 2022-08-23 ENCOUNTER — HOSPITAL ENCOUNTER (OUTPATIENT)
Dept: MRI IMAGING | Facility: HOSPITAL | Age: 57
Discharge: HOME | End: 2022-08-23
Payer: COMMERCIAL

## 2022-08-23 DIAGNOSIS — N64.4 PAINFUL BREASTS: ICD-10-CM

## 2022-08-23 DIAGNOSIS — N63.0 MASS OF BREAST, UNSPECIFIED LATERALITY: ICD-10-CM

## 2022-08-23 PROCEDURE — A9575 INJ GADOTERATE MEGLUMI 0.1ML: HCPCS

## 2022-08-23 PROCEDURE — 77049 MRI BREAST C-+ W/CAD BI: CPT

## 2022-08-25 DIAGNOSIS — Z85.3 ENCOUNTER FOR FOLLOW-UP SURVEILLANCE OF BREAST CANCER: Primary | ICD-10-CM

## 2022-08-25 DIAGNOSIS — Z08 ENCOUNTER FOR FOLLOW-UP SURVEILLANCE OF BREAST CANCER: Primary | ICD-10-CM

## 2022-08-31 ENCOUNTER — OFFICE VISIT (OUTPATIENT)
Dept: UROLOGY | Facility: CLINIC | Age: 57
End: 2022-08-31
Attending: OBSTETRICS & GYNECOLOGY
Payer: COMMERCIAL

## 2022-08-31 VITALS — TEMPERATURE: 97 F | HEIGHT: 61 IN | WEIGHT: 114 LBS | BODY MASS INDEX: 21.52 KG/M2

## 2022-08-31 DIAGNOSIS — N95.2 VAGINAL ATROPHY: Primary | ICD-10-CM

## 2022-08-31 DIAGNOSIS — N81.10 CYSTOCELE WITH RECTOCELE: ICD-10-CM

## 2022-08-31 DIAGNOSIS — M62.89 PELVIC FLOOR TENSION: ICD-10-CM

## 2022-08-31 DIAGNOSIS — N81.6 CYSTOCELE WITH RECTOCELE: ICD-10-CM

## 2022-08-31 DIAGNOSIS — N39.3 STRESS INCONTINENCE: ICD-10-CM

## 2022-08-31 PROCEDURE — 99212 OFFICE O/P EST SF 10 MIN: CPT

## 2022-08-31 RX ORDER — ESTRADIOL 10 UG/1
10 INSERT VAGINAL
COMMUNITY

## 2022-09-05 DIAGNOSIS — N63.0 PAINFUL LUMPY BREASTS: Primary | ICD-10-CM

## 2022-09-05 DIAGNOSIS — N64.4 PAINFUL LUMPY BREASTS: Primary | ICD-10-CM

## 2022-09-08 ENCOUNTER — TELEPHONE (OUTPATIENT)
Dept: OBGYN CLINIC | Facility: CLINIC | Age: 57
End: 2022-09-08

## 2022-12-13 ENCOUNTER — TELEPHONE (OUTPATIENT)
Dept: UROLOGY | Facility: CLINIC | Age: 57
End: 2022-12-13

## 2022-12-13 NOTE — TELEPHONE ENCOUNTER
TC to patient - is scheduled for return visit with Dr. Oneida Camacho tomorrow and it was noted in chart that UDS and UDS f/u previously was cancelled- Called patient to discuss scheduling UDS prior to coming in -  Patient does not want to re schedule UDS - is still interested in surgery , but feels that UDS is not necessary since she does not have a history of NASEEM UUI- UDS explained in detail and patient reports that she has an extreme fear of catheters and will pass out due to history of vagal responses. Reassurance provided patient insists on keeping appt with  to further discuss having surgery without having to undergo UDS.  Appt at 2:40 pm 12/14/22 confirmed

## 2022-12-14 ENCOUNTER — OFFICE VISIT (OUTPATIENT)
Dept: UROLOGY | Facility: CLINIC | Age: 57
End: 2022-12-14
Attending: OBSTETRICS & GYNECOLOGY
Payer: COMMERCIAL

## 2022-12-14 VITALS — TEMPERATURE: 97 F | BODY MASS INDEX: 21.52 KG/M2 | WEIGHT: 114 LBS | HEIGHT: 61 IN

## 2022-12-14 DIAGNOSIS — N81.10 CYSTOCELE WITH RECTOCELE: Primary | ICD-10-CM

## 2022-12-14 DIAGNOSIS — N81.6 CYSTOCELE WITH RECTOCELE: Primary | ICD-10-CM

## 2022-12-14 PROCEDURE — 99212 OFFICE O/P EST SF 10 MIN: CPT

## 2023-02-04 ENCOUNTER — LAB ENCOUNTER (OUTPATIENT)
Dept: LAB | Age: 58
End: 2023-02-04
Attending: OBSTETRICS & GYNECOLOGY
Payer: COMMERCIAL

## 2023-02-04 DIAGNOSIS — Z01.818 PRE-OP TESTING: ICD-10-CM

## 2023-02-05 LAB — SARS-COV-2 RNA RESP QL NAA+PROBE: NOT DETECTED

## 2023-02-07 ENCOUNTER — ANESTHESIA (OUTPATIENT)
Dept: SURGERY | Facility: HOSPITAL | Age: 58
End: 2023-02-07
Payer: COMMERCIAL

## 2023-02-07 ENCOUNTER — ANESTHESIA EVENT (OUTPATIENT)
Dept: SURGERY | Facility: HOSPITAL | Age: 58
End: 2023-02-07
Payer: COMMERCIAL

## 2023-02-07 ENCOUNTER — HOSPITAL ENCOUNTER (OUTPATIENT)
Facility: HOSPITAL | Age: 58
Discharge: HOME | End: 2023-02-08
Attending: OBSTETRICS & GYNECOLOGY | Admitting: OBSTETRICS & GYNECOLOGY
Payer: COMMERCIAL

## 2023-02-07 DIAGNOSIS — Z01.818 PRE-OP TESTING: Primary | ICD-10-CM

## 2023-02-07 PROCEDURE — 0JQC3ZZ REPAIR PELVIC REGION SUBCUTANEOUS TISSUE AND FASCIA, PERCUTANEOUS APPROACH: ICD-10-PCS | Performed by: OBSTETRICS & GYNECOLOGY

## 2023-02-07 PROCEDURE — 0USG7ZZ REPOSITION VAGINA, VIA NATURAL OR ARTIFICIAL OPENING: ICD-10-PCS | Performed by: OBSTETRICS & GYNECOLOGY

## 2023-02-07 RX ORDER — LISINOPRIL 10 MG/1
10 TABLET ORAL DAILY
Status: DISCONTINUED | OUTPATIENT
Start: 2023-02-07 | End: 2023-02-08

## 2023-02-07 RX ORDER — BUPIVACAINE HYDROCHLORIDE AND EPINEPHRINE 2.5; 5 MG/ML; UG/ML
INJECTION, SOLUTION EPIDURAL; INFILTRATION; INTRACAUDAL; PERINEURAL AS NEEDED
Status: DISCONTINUED | OUTPATIENT
Start: 2023-02-07 | End: 2023-02-07

## 2023-02-07 RX ORDER — ENOXAPARIN SODIUM 100 MG/ML
40 INJECTION SUBCUTANEOUS DAILY
Status: DISCONTINUED | OUTPATIENT
Start: 2023-02-08 | End: 2023-02-08

## 2023-02-07 RX ORDER — GLYCOPYRROLATE 0.2 MG/ML
INJECTION, SOLUTION INTRAMUSCULAR; INTRAVENOUS AS NEEDED
Status: DISCONTINUED | OUTPATIENT
Start: 2023-02-07 | End: 2023-02-07 | Stop reason: SURG

## 2023-02-07 RX ORDER — LIDOCAINE HYDROCHLORIDE 10 MG/ML
INJECTION, SOLUTION EPIDURAL; INFILTRATION; INTRACAUDAL; PERINEURAL AS NEEDED
Status: DISCONTINUED | OUTPATIENT
Start: 2023-02-07 | End: 2023-02-07 | Stop reason: SURG

## 2023-02-07 RX ORDER — PROCHLORPERAZINE EDISYLATE 5 MG/ML
5 INJECTION INTRAMUSCULAR; INTRAVENOUS EVERY 8 HOURS PRN
Status: DISCONTINUED | OUTPATIENT
Start: 2023-02-07 | End: 2023-02-07 | Stop reason: HOSPADM

## 2023-02-07 RX ORDER — CEFAZOLIN SODIUM/WATER 2 G/20 ML
2 SYRINGE (ML) INTRAVENOUS ONCE
Status: COMPLETED | OUTPATIENT
Start: 2023-02-07 | End: 2023-02-07

## 2023-02-07 RX ORDER — KETOROLAC TROMETHAMINE 30 MG/ML
INJECTION, SOLUTION INTRAMUSCULAR; INTRAVENOUS AS NEEDED
Status: DISCONTINUED | OUTPATIENT
Start: 2023-02-07 | End: 2023-02-07 | Stop reason: SURG

## 2023-02-07 RX ORDER — HYDROMORPHONE HYDROCHLORIDE 1 MG/ML
0.8 INJECTION, SOLUTION INTRAMUSCULAR; INTRAVENOUS; SUBCUTANEOUS EVERY 2 HOUR PRN
Status: DISCONTINUED | OUTPATIENT
Start: 2023-02-07 | End: 2023-02-08

## 2023-02-07 RX ORDER — MIDAZOLAM HYDROCHLORIDE 1 MG/ML
1 INJECTION INTRAMUSCULAR; INTRAVENOUS EVERY 5 MIN PRN
Status: DISCONTINUED | OUTPATIENT
Start: 2023-02-07 | End: 2023-02-07 | Stop reason: HOSPADM

## 2023-02-07 RX ORDER — EPHEDRINE SULFATE 50 MG/ML
INJECTION INTRAVENOUS AS NEEDED
Status: DISCONTINUED | OUTPATIENT
Start: 2023-02-07 | End: 2023-02-07 | Stop reason: SURG

## 2023-02-07 RX ORDER — ONDANSETRON 2 MG/ML
4 INJECTION INTRAMUSCULAR; INTRAVENOUS EVERY 6 HOURS PRN
Status: DISCONTINUED | OUTPATIENT
Start: 2023-02-07 | End: 2023-02-07 | Stop reason: HOSPADM

## 2023-02-07 RX ORDER — TEMAZEPAM 7.5 MG/1
7.5 CAPSULE ORAL NIGHTLY PRN
Status: DISCONTINUED | OUTPATIENT
Start: 2023-02-07 | End: 2023-02-08

## 2023-02-07 RX ORDER — ONDANSETRON 2 MG/ML
INJECTION INTRAMUSCULAR; INTRAVENOUS AS NEEDED
Status: DISCONTINUED | OUTPATIENT
Start: 2023-02-07 | End: 2023-02-07 | Stop reason: SURG

## 2023-02-07 RX ORDER — ACETAMINOPHEN 500 MG
1000 TABLET ORAL ONCE
Status: DISCONTINUED | OUTPATIENT
Start: 2023-02-07 | End: 2023-02-07 | Stop reason: HOSPADM

## 2023-02-07 RX ORDER — SODIUM CHLORIDE, SODIUM LACTATE, POTASSIUM CHLORIDE, CALCIUM CHLORIDE 600; 310; 30; 20 MG/100ML; MG/100ML; MG/100ML; MG/100ML
INJECTION, SOLUTION INTRAVENOUS CONTINUOUS
Status: DISCONTINUED | OUTPATIENT
Start: 2023-02-07 | End: 2023-02-08

## 2023-02-07 RX ORDER — HYDROMORPHONE HYDROCHLORIDE 1 MG/ML
INJECTION, SOLUTION INTRAMUSCULAR; INTRAVENOUS; SUBCUTANEOUS
Status: COMPLETED
Start: 2023-02-07 | End: 2023-02-07

## 2023-02-07 RX ORDER — ONDANSETRON 4 MG/1
4 TABLET, FILM COATED ORAL EVERY 8 HOURS PRN
Status: DISCONTINUED | OUTPATIENT
Start: 2023-02-07 | End: 2023-02-08

## 2023-02-07 RX ORDER — PHENAZOPYRIDINE HYDROCHLORIDE 200 MG/1
200 TABLET, FILM COATED ORAL 3 TIMES DAILY PRN
Status: DISCONTINUED | OUTPATIENT
Start: 2023-02-07 | End: 2023-02-08

## 2023-02-07 RX ORDER — HYDROMORPHONE HYDROCHLORIDE 1 MG/ML
0.2 INJECTION, SOLUTION INTRAMUSCULAR; INTRAVENOUS; SUBCUTANEOUS EVERY 2 HOUR PRN
Status: DISCONTINUED | OUTPATIENT
Start: 2023-02-07 | End: 2023-02-08

## 2023-02-07 RX ORDER — TRAMADOL HYDROCHLORIDE 50 MG/1
50 TABLET ORAL EVERY 6 HOURS SCHEDULED
Status: DISCONTINUED | OUTPATIENT
Start: 2023-02-07 | End: 2023-02-08

## 2023-02-07 RX ORDER — KETOROLAC TROMETHAMINE 30 MG/ML
30 INJECTION, SOLUTION INTRAMUSCULAR; INTRAVENOUS EVERY 6 HOURS
Status: DISCONTINUED | OUTPATIENT
Start: 2023-02-07 | End: 2023-02-08

## 2023-02-07 RX ORDER — DIPHENHYDRAMINE HYDROCHLORIDE 50 MG/ML
12.5 INJECTION INTRAMUSCULAR; INTRAVENOUS EVERY 4 HOURS PRN
Status: DISCONTINUED | OUTPATIENT
Start: 2023-02-07 | End: 2023-02-08

## 2023-02-07 RX ORDER — DIPHENHYDRAMINE HYDROCHLORIDE 50 MG/ML
12.5 INJECTION INTRAMUSCULAR; INTRAVENOUS AS NEEDED
Status: DISCONTINUED | OUTPATIENT
Start: 2023-02-07 | End: 2023-02-07 | Stop reason: HOSPADM

## 2023-02-07 RX ORDER — HYDROMORPHONE HYDROCHLORIDE 1 MG/ML
0.4 INJECTION, SOLUTION INTRAMUSCULAR; INTRAVENOUS; SUBCUTANEOUS EVERY 2 HOUR PRN
Status: DISCONTINUED | OUTPATIENT
Start: 2023-02-07 | End: 2023-02-08

## 2023-02-07 RX ORDER — NALOXONE HYDROCHLORIDE 0.4 MG/ML
80 INJECTION, SOLUTION INTRAMUSCULAR; INTRAVENOUS; SUBCUTANEOUS AS NEEDED
Status: DISCONTINUED | OUTPATIENT
Start: 2023-02-07 | End: 2023-02-07 | Stop reason: HOSPADM

## 2023-02-07 RX ORDER — BUPROPION HYDROCHLORIDE 150 MG/1
150 TABLET, EXTENDED RELEASE ORAL DAILY
Status: DISCONTINUED | OUTPATIENT
Start: 2023-02-08 | End: 2023-02-07 | Stop reason: SDUPTHER

## 2023-02-07 RX ORDER — BUPROPION HYDROCHLORIDE 150 MG/1
150 TABLET ORAL DAILY
Status: DISCONTINUED | OUTPATIENT
Start: 2023-02-08 | End: 2023-02-08

## 2023-02-07 RX ORDER — DEXAMETHASONE SODIUM PHOSPHATE 4 MG/ML
VIAL (ML) INJECTION AS NEEDED
Status: DISCONTINUED | OUTPATIENT
Start: 2023-02-07 | End: 2023-02-07 | Stop reason: SURG

## 2023-02-07 RX ORDER — ONDANSETRON 2 MG/ML
4 INJECTION INTRAMUSCULAR; INTRAVENOUS EVERY 8 HOURS PRN
Status: DISCONTINUED | OUTPATIENT
Start: 2023-02-07 | End: 2023-02-08

## 2023-02-07 RX ORDER — IBUPROFEN 600 MG/1
600 TABLET ORAL EVERY 6 HOURS SCHEDULED
Status: DISCONTINUED | OUTPATIENT
Start: 2023-02-08 | End: 2023-02-08

## 2023-02-07 RX ORDER — MIDAZOLAM HYDROCHLORIDE 1 MG/ML
INJECTION INTRAMUSCULAR; INTRAVENOUS AS NEEDED
Status: DISCONTINUED | OUTPATIENT
Start: 2023-02-07 | End: 2023-02-07 | Stop reason: SURG

## 2023-02-07 RX ORDER — HYDROMORPHONE HYDROCHLORIDE 1 MG/ML
0.4 INJECTION, SOLUTION INTRAMUSCULAR; INTRAVENOUS; SUBCUTANEOUS EVERY 5 MIN PRN
Status: DISCONTINUED | OUTPATIENT
Start: 2023-02-07 | End: 2023-02-07 | Stop reason: HOSPADM

## 2023-02-07 RX ORDER — ACETAMINOPHEN 500 MG
1000 TABLET ORAL ONCE AS NEEDED
Status: DISCONTINUED | OUTPATIENT
Start: 2023-02-07 | End: 2023-02-07 | Stop reason: HOSPADM

## 2023-02-07 RX ORDER — LEVOTHYROXINE SODIUM 0.05 MG/1
50 TABLET ORAL
Status: DISCONTINUED | OUTPATIENT
Start: 2023-02-08 | End: 2023-02-08

## 2023-02-07 RX ORDER — MIDAZOLAM HYDROCHLORIDE 1 MG/ML
INJECTION INTRAMUSCULAR; INTRAVENOUS
Status: COMPLETED
Start: 2023-02-07 | End: 2023-02-07

## 2023-02-07 RX ORDER — LABETALOL HYDROCHLORIDE 5 MG/ML
5 INJECTION, SOLUTION INTRAVENOUS EVERY 5 MIN PRN
Status: DISCONTINUED | OUTPATIENT
Start: 2023-02-07 | End: 2023-02-07 | Stop reason: HOSPADM

## 2023-02-07 RX ORDER — HYDROMORPHONE HYDROCHLORIDE 1 MG/ML
0.6 INJECTION, SOLUTION INTRAMUSCULAR; INTRAVENOUS; SUBCUTANEOUS EVERY 5 MIN PRN
Status: DISCONTINUED | OUTPATIENT
Start: 2023-02-07 | End: 2023-02-07 | Stop reason: HOSPADM

## 2023-02-07 RX ORDER — SODIUM CHLORIDE, SODIUM LACTATE, POTASSIUM CHLORIDE, CALCIUM CHLORIDE 600; 310; 30; 20 MG/100ML; MG/100ML; MG/100ML; MG/100ML
INJECTION, SOLUTION INTRAVENOUS CONTINUOUS
Status: DISCONTINUED | OUTPATIENT
Start: 2023-02-07 | End: 2023-02-07 | Stop reason: HOSPADM

## 2023-02-07 RX ORDER — SCOLOPAMINE TRANSDERMAL SYSTEM 1 MG/1
1 PATCH, EXTENDED RELEASE TRANSDERMAL ONCE
Status: DISCONTINUED | OUTPATIENT
Start: 2023-02-07 | End: 2023-02-08

## 2023-02-07 RX ORDER — HYDROMORPHONE HYDROCHLORIDE 1 MG/ML
0.2 INJECTION, SOLUTION INTRAMUSCULAR; INTRAVENOUS; SUBCUTANEOUS EVERY 5 MIN PRN
Status: DISCONTINUED | OUTPATIENT
Start: 2023-02-07 | End: 2023-02-07 | Stop reason: HOSPADM

## 2023-02-07 RX ADMIN — ONDANSETRON 4 MG: 2 INJECTION INTRAMUSCULAR; INTRAVENOUS at 15:10:00

## 2023-02-07 RX ADMIN — LIDOCAINE HYDROCHLORIDE 50 MG: 10 INJECTION, SOLUTION EPIDURAL; INFILTRATION; INTRACAUDAL; PERINEURAL at 13:59:00

## 2023-02-07 RX ADMIN — MIDAZOLAM HYDROCHLORIDE 2 MG: 1 INJECTION INTRAMUSCULAR; INTRAVENOUS at 13:55:00

## 2023-02-07 RX ADMIN — EPHEDRINE SULFATE 5 MG: 50 INJECTION INTRAVENOUS at 14:34:00

## 2023-02-07 RX ADMIN — DEXAMETHASONE SODIUM PHOSPHATE 8 MG: 4 MG/ML VIAL (ML) INJECTION at 14:05:00

## 2023-02-07 RX ADMIN — EPHEDRINE SULFATE 5 MG: 50 INJECTION INTRAVENOUS at 14:31:00

## 2023-02-07 RX ADMIN — KETOROLAC TROMETHAMINE 30 MG: 30 INJECTION, SOLUTION INTRAMUSCULAR; INTRAVENOUS at 15:18:00

## 2023-02-07 RX ADMIN — SODIUM CHLORIDE, SODIUM LACTATE, POTASSIUM CHLORIDE, CALCIUM CHLORIDE: 600; 310; 30; 20 INJECTION, SOLUTION INTRAVENOUS at 13:55:00

## 2023-02-07 RX ADMIN — GLYCOPYRROLATE 0.2 MG: 0.2 INJECTION, SOLUTION INTRAMUSCULAR; INTRAVENOUS at 14:34:00

## 2023-02-07 RX ADMIN — CEFAZOLIN SODIUM/WATER 2 G: 2 G/20 ML SYRINGE (ML) INTRAVENOUS at 14:08:00

## 2023-02-07 NOTE — ANESTHESIA PROCEDURE NOTES
Airway  Date/Time: 2/7/2023 2:01 PM  Urgency: elective      General Information and Staff    Patient location during procedure: OR  Anesthesiologist: Con Oh DO  Performed: anesthesiologist     Indications and Patient Condition  Indications for airway management: anesthesia  Sedation level: deep  Preoxygenated: yes  Patient position: sniffing  Mask difficulty assessment: 0 - not attempted    Final Airway Details  Final airway type: supraglottic airway      Successful airway: classic  Size 3       Number of attempts at approach: 1

## 2023-02-07 NOTE — DISCHARGE INSTRUCTIONS
Pelvic Medicine Postoperative Instructions:    PAIN CONTROL  Take Ibuprofen every 6 hours on a schedule. If you need more pain medication, you can take Tramadol - these are also taken every 6 hours, but you should take one of these in between your doses of ibuprofen (so that you are getting pain medication every 3 hours, but alternating between ibuprofen and Tramadol)    1. AVOID CONSTIPATION:   -Take Miralax one capful in water or juice each morning.    -Take Fiber supplement along with Miralax as well. These can be mixed together in the same glass of liquid.  -On any day that you don't have a bowel movement, you can take 1-2 teaspoons of Milk of Magnesia that evening   2. No lifting over 10 pounds (1 gallon of milk is 8 pounds). 3. Showers and tub baths are okay, even if you have a catheter or abdominal incision. 4. You may ride in a car immediately. 5. You may drive after 1-2 weeks as long as you are not taking any narcotic pain medications    Please call us for any of the following:  -Temperature above 100.5 for 4 hours or above 101.0 at any time.  -Chest pain or trouble breathing.  -Vaginal bleeding heavier than a period.  -Redness, tenderness or swelling of your legs  -Pain or burning when you urinate; or if you go home with a catheter, trouble with catheter draining.  -Redness, pain or foul discharge from incision.     Office telephone: [phone]  After hours: [phone]

## 2023-02-07 NOTE — BRIEF OP NOTE
Pre-Operative Diagnosis: CYSTOCELE RECTOCELE,      Post-Operative Diagnosis: CYSTOCELE RECTOCELE, ENTEROCELE, VAULT PROLAPSE     Procedure Performed:   ANTERIOR POSTERIOR REPAIR, ENTEROCELE REPAIR, CYSTOSCOPY, uterosacral ligament suspension,    Surgeon(s) and Role:     * David Terry MD - Primary     * Jose L Ovalles MD - Assisting Surgeon    Assistant(s):   Sarina Sorensen MD     Surgical Findings: Stage 2 cystocele, rectocele, enterocele, vault prolapse. 0.5 cm bladder lesion.       Specimen: None     Estimated Blood Loss: Blood Output: 100 mL (2/7/2023  3:11 PM)      Declan Moore MD  2/7/2023  3:47 PM

## 2023-02-07 NOTE — PROGRESS NOTES
Pt is alert and oriented x4. VSS. On room air. SCDs in place. Denies shortness of breath or difficulty in breathing. Denies pain at this time. abdomen soft and nontender. No nausea. peripad and mesh panties in place. IVF running. Started on clears; will advance as tolerated. Updated on plan of care. Call light within reach.

## 2023-02-07 NOTE — INTERVAL H&P NOTE
Pre-op Diagnosis: CYSTOCELE RECTOCELE    The above referenced H&P was reviewed by Mundo Padilla MD on 2/7/2023, the patient was examined and no significant changes have occurred in the patient's condition since the H&P was performed. I discussed with the patient and/or legal representative the potential benefits, risks and side effects of this procedure; the likelihood of the patient achieving goals; and potential problems that might occur during recuperation. I discussed reasonable alternatives to the procedure, including risks, benefits and side effects related to the alternatives and risks related to not receiving this procedure. We will proceed with procedure as planned.

## 2023-02-08 VITALS
WEIGHT: 116 LBS | HEIGHT: 61 IN | TEMPERATURE: 98 F | HEART RATE: 52 BPM | RESPIRATION RATE: 18 BRPM | DIASTOLIC BLOOD PRESSURE: 65 MMHG | OXYGEN SATURATION: 100 % | BODY MASS INDEX: 21.9 KG/M2 | SYSTOLIC BLOOD PRESSURE: 112 MMHG

## 2023-02-08 LAB
ANION GAP SERPL CALC-SCNC: 3 MMOL/L (ref 0–18)
BUN BLD-MCNC: 7 MG/DL (ref 7–18)
CALCIUM BLD-MCNC: 9 MG/DL (ref 8.5–10.1)
CHLORIDE SERPL-SCNC: 112 MMOL/L (ref 98–112)
CO2 SERPL-SCNC: 27 MMOL/L (ref 21–32)
CREAT BLD-MCNC: 0.59 MG/DL
ERYTHROCYTE [DISTWIDTH] IN BLOOD BY AUTOMATED COUNT: 12.3 %
GFR SERPLBLD BASED ON 1.73 SQ M-ARVRAT: 106 ML/MIN/1.73M2 (ref 60–?)
GLUCOSE BLD-MCNC: 108 MG/DL (ref 70–99)
HCT VFR BLD AUTO: 34.6 %
HGB BLD-MCNC: 11.7 G/DL
MCH RBC QN AUTO: 31.6 PG (ref 26–34)
MCHC RBC AUTO-ENTMCNC: 33.8 G/DL (ref 31–37)
MCV RBC AUTO: 93.5 FL
OSMOLALITY SERPL CALC.SUM OF ELEC: 293 MOSM/KG (ref 275–295)
PLATELET # BLD AUTO: 247 10(3)UL (ref 150–450)
POTASSIUM SERPL-SCNC: 4.3 MMOL/L (ref 3.5–5.1)
RBC # BLD AUTO: 3.7 X10(6)UL
SODIUM SERPL-SCNC: 142 MMOL/L (ref 136–145)
WBC # BLD AUTO: 10.4 X10(3) UL (ref 4–11)

## 2023-02-08 PROCEDURE — 85027 COMPLETE CBC AUTOMATED: CPT | Performed by: OBSTETRICS & GYNECOLOGY

## 2023-02-08 PROCEDURE — 80048 BASIC METABOLIC PNL TOTAL CA: CPT | Performed by: OBSTETRICS & GYNECOLOGY

## 2023-02-08 RX ORDER — IBUPROFEN 600 MG/1
600 TABLET ORAL EVERY 6 HOURS PRN
Qty: 30 TABLET | Refills: 0 | Status: SHIPPED | OUTPATIENT
Start: 2023-02-08

## 2023-02-08 RX ORDER — TRAMADOL HYDROCHLORIDE 50 MG/1
50 TABLET ORAL EVERY 6 HOURS PRN
Qty: 20 TABLET | Refills: 0 | Status: SHIPPED | OUTPATIENT
Start: 2023-02-08

## 2023-02-08 RX ORDER — LISINOPRIL 10 MG/1
10 TABLET ORAL NIGHTLY
Status: DISCONTINUED | OUTPATIENT
Start: 2023-02-08 | End: 2023-02-08

## 2023-02-08 NOTE — PLAN OF CARE
A&Ox4. VSS. RA. Denies chest pain and SOB. GI: Abdomen soft, nondistended. Passing gas. Denies nausea. : Cazares catheter in place draining clear yellow urine. PT will go home with cazares. Pain controlled with scheduled pain medications. Up with standby assist.   Incisions: [place]ODROW to assess vaginal incisions. [place] pad and mesh panties in place. Scant drainage, changed to fresh ones after pts walk in the hallway. Diet: Tolerating regular diet. IVF running per order. Will SL in AM if tolerating PO per order. All appropriate safety measures in place. All questions and concerns addressed.       Problem: PAIN - ADULT  Goal: Verbalizes/displays adequate comfort level or patient's stated pain goal  Description: INTERVENTIONS:  - Encourage pt to monitor pain and request assistance  - Assess pain using appropriate pain scale  - Administer analgesics based on type and severity of pain and evaluate response  - Implement non-pharmacological measures as appropriate and evaluate response  - Consider cultural and social influences on pain and pain management  - Manage/alleviate anxiety  - Utilize distraction and/or relaxation techniques  - Monitor for opioid side effects  - Notify MD/LIP if interventions unsuccessful or patient reports new pain  - Anticipate increased pain with activity and pre-medicate as appropriate  Outcome: Progressing     Problem: RISK FOR INFECTION - ADULT  Goal: Absence of fever/infection during anticipated neutropenic period  Description: INTERVENTIONS  - Monitor WBC  - Administer growth factors as ordered  - Implement neutropenic guidelines  Outcome: Progressing     Problem: DISCHARGE PLANNING  Goal: Discharge to home or other facility with appropriate resources  Description: INTERVENTIONS:  - Identify barriers to discharge w/pt and caregiver  - Include patient/family/discharge partner in discharge planning  - Arrange for needed discharge resources and transportation as appropriate  - Identify discharge learning needs (meds, wound care, etc)  - Arrange for interpreters to assist at discharge as needed  - Consider post-discharge preferences of patient/family/discharge partner  - Complete POLST form as appropriate  - Assess patient's ability to be responsible for managing their own health  - Refer to Case Management Department for coordinating discharge planning if the patient needs post-hospital services based on physician/LIP order or complex needs related to functional status, cognitive ability or social support system  Outcome: Progressing

## 2023-02-08 NOTE — OPERATIVE REPORT
Concetta Gamble  : 1965  MRN: [id number]  Date of Surgery: 2023           Pre-operative diagnosis:  1. Stage II cystocele, rectocele    Post-operative diagnosis:  1. Stage II post hysterectomy vaginal vault prolapse, cystocele, rectocele, enterocele. 2. Bladder lesion    Procedures:  Repair of enterocele, uterosacral vaginal vault suspension, anterior colporrhaphy, posterior colporrhaphy, retropubic midurethral sling, cystoscopy. Surgeon:  Lorenzo Keita MD     Assistant:  Merlinda Sos, MD    Location: [facility], [place] 4     Anesthesia:  GETA     Estimated blood loss:  152 ml      Complications: None     Specimens: None    Drains: Transurethral Patterson catheter     Findings: Stage II post hysterectomy vaginal vault prolapse with associated cystocele, rectocele and enterocele. Bilateral ureteral efflux. 0.5 cm polypoid bladder lesion. Hemostasis upon completion of the case. Description of the Procedure: The patient was taken to operating room 4. General anesthesia was obtained without difficulty. She was then positioned in dorsal lithotomy position in [facility]. She was then prepped and draped in the usual sterile fashion. Surgical time out was performed. Dr. Merlinda Sos was utilized as a surgical assist for the entire procedure due to the need of tissue retraction, dissection of vital structures, prevention and management of blood loss and reduction in overall operative time and anesthesia time. The Magrina vaginal retractor was placed into the vagina. Examination confirmed the findings described above. The leading edge of the prolapse was the anterior vaginal wall. Allis clamps were used to grasp the redundant anterior vaginal epithelium and a vertical midline incision was made after infiltration with diluted Marcaine with epinephrine. The redundant epithelium was dissected from the underlying endopelvic connective tissue of the bladder.   This dissection was carried to the vaginal apex where the enterocele was encountered. The enterocele was entered without difficulty. There were no pelvic adhesions. Due to the presence of apical prolapse and associated enterocele, it was necessary to proceed with suspension of the vaginal apex and enterocele repair. The uterosacral ligaments were identified. A Page culdoplasty was performed first. Utilizing 1-0 Vicryl suture, a Page stitch was placed through the posterior vaginal wall, into the distal left uterosacral ligament, across the posterior cul-de-sac peritoneum, through the distal right uterosacral ligament and back through the posterior vagina in order to repair the enterocele. This suture was held in tension. Following this, two uterosacral suspension stitches were then placed using 1-0 Vicryl sutures on each side of the pelvis. These were placed high on the uterosacral ligaments at and just proximal to the ischial spine. Once these sutures were placed and held in tension, cystoscopy was performed with a 70-degree scope. Cystoscopy confirmed that there had been no injury to the bladder. There was noted to be prompt flow of urine from both ureteral orifices, confirming ureteral patency. Of note, a 0.5 cm polypoid bladder lesion was seen to the left of the trigone. Digital photos were obtained. The patient will be referred to Urology for outpatient evaluation. Following this, the already dissected endopelvic connective tissue of anterior vaginal wall was repaired with interrupted 0 Vicryl sutures, obliterating the cystocele. The excess vaginal epithelium was excised and the midline incision and vaginal apex was then closed with 2-0 Vicryl suture in running locking fashion. The uterosacral vault and Page suspension sutures were then tied down resulting in excellent elevation of the vaginal apex. Cystoscopy was repeated. Ureteral patency was confirmed once again.      Attention was then directed to the posterior compartment. The redundant posterior vaginal epithelium was excised after infiltration of Marcaine with epinephrine. Then utilizing interrupted No. 0 Vicryl; the endopelvic fibromuscular tissue was plicated in the midline in the standard fashion. The vaginal epithelium was reapproximated with 2-0 Vicryl in a running fashion. She had a well-supported perineal body. Inspection at this point revealed a well-supported vaginal apex, anterior and posterior compartments, with good hemostasis. Rectal examination confirmed no injury to the rectum. A transurethral Patterson catheter was left in place. The patient was then removed from the dorsal lithotomy position, awakened and extubated and transferred from the operating room to the recovery room in stable condition, having tolerated the procedure well. Sponge, lap, & needle counts were correct. There were no complications.      Ander Garsia MD

## 2023-02-08 NOTE — PROGRESS NOTES
Operative findings discussed including incidental finding of small bladder mass.     Pain controlled, tolerating general diet  Abd soft, NT  urine clear in Patterson    Hgb 11.7    POD 1 - doing well    PLAN -  Patterson/leg bag teaching  Home today  F/U 1 week for catheter removal  D/C instructions reviewed  Urology consultation as outpt discussed

## 2023-02-08 NOTE — PLAN OF CARE
Problem: PAIN - ADULT  Goal: Verbalizes/displays adequate comfort level or patient's stated pain goal  Description: INTERVENTIONS:  - Encourage pt to monitor pain and request assistance  - Assess pain using appropriate pain scale  - Administer analgesics based on type and severity of pain and evaluate response  - Implement non-pharmacological measures as appropriate and evaluate response  - Consider cultural and social influences on pain and pain management  - Manage/alleviate anxiety  - Utilize distraction and/or relaxation techniques  - Monitor for opioid side effects  - Notify MD/LIP if interventions unsuccessful or patient reports new pain  - Anticipate increased pain with activity and pre-medicate as appropriate  Outcome: Adequate for Discharge     Problem: RISK FOR INFECTION - ADULT  Goal: Absence of fever/infection during anticipated neutropenic period  Description: INTERVENTIONS  - Monitor WBC  - Administer growth factors as ordered  - Implement neutropenic guidelines  Outcome: Adequate for Discharge     Problem: DISCHARGE PLANNING  Goal: Discharge to home or other facility with appropriate resources  Description: INTERVENTIONS:  - Identify barriers to discharge w/pt and caregiver  - Include patient/family/discharge partner in discharge planning  - Arrange for needed discharge resources and transportation as appropriate  - Identify discharge learning needs (meds, wound care, etc)  - Arrange for interpreters to assist at discharge as needed  - Consider post-discharge preferences of patient/family/discharge partner  - Complete POLST form as appropriate  - Assess patient's ability to be responsible for managing their own health  - Refer to Case Management Department for coordinating discharge planning if the patient needs post-hospital services based on physician/LIP order or complex needs related to functional status, cognitive ability or social support system  Outcome: Adequate for Discharge     Problem: Patient/Family Goals  Goal: Patient/Family Long Term Goal  Description: Patient's Long Term Goal: Discharge home    Interventions:  - Pain tolerable  - Tolerating diet  - Return to previous ADL's  - See additional Care Plan goals for specific interventions  Outcome: Adequate for Discharge  Goal: Patient/Family Short Term Goal  Description: Patient's Short Term Goal: Prepare for discharge home    Interventions:   - Advance diet as tolerated  - Transition IV to oral pain medication  - Encourage ambulation  - See additional Care Plan goals for specific interventions  Outcome: Adequate for Discharge

## 2023-02-08 NOTE — PROGRESS NOTES
NURSING DISCHARGE NOTE    Discharged Home via Wheelchair. Accompanied by Spouse  Belongings Taken by patient/family. Patient discharged home in stable condition. Patient left the floor via wheelchair and was accompanied by her . Discharge instructions given and were explained in detail. Prescriptions for Ibuprofen and Tramadol as well as OTC Miralax were filled and delivered to patient's room by [facility]-educated on the uses and when the next doses are available. Educated patient on activity restrictions, incision care, and on signs of infection. Performed demonstration of cazares catheter care and had patient perform a return demonstration. Instructed patient to schedule a follow up appointment on 2/13 for cazares catheter removal as well as a 6 week post op appointment with Sara Bauer. Patient stated understanding of discharge instructions and had no further questions at this time. Saline lock removed.

## 2023-02-09 ENCOUNTER — TELEPHONE (OUTPATIENT)
Dept: UROLOGY | Facility: CLINIC | Age: 58
End: 2023-02-09

## 2023-02-09 NOTE — TELEPHONE ENCOUNTER
TC from pt c/o \"leakage\" around catheter insertion site and some \"Stinging\" to area. Explained normalcy of bladder spasm/leaking post operative, pt reports + yellow urine collecting in urine bag. Discussed use of aquphor/ad to inner labia for comfort and use of stat lock/switfching sides of it's adherence to remove catheter off irritation site. Pt has not had bowel movement as of yet. Reviewed bowel regimen. Currently only using miralax. Discussed addition of metamucil or benefiber with miralax and prn milk of mag or glycerin suppositories. PO hydration encouraged along with q 3 hour pain management via ibuprofen/tramadol rotation.

## 2023-02-13 ENCOUNTER — OFFICE VISIT (OUTPATIENT)
Dept: UROLOGY | Facility: CLINIC | Age: 58
End: 2023-02-13
Attending: OBSTETRICS & GYNECOLOGY
Payer: COMMERCIAL

## 2023-02-13 VITALS — TEMPERATURE: 97 F | BODY MASS INDEX: 21.9 KG/M2 | HEIGHT: 61 IN | WEIGHT: 116 LBS

## 2023-02-13 DIAGNOSIS — N99.89 POSTOPERATIVE RETENTION OF URINE: Primary | ICD-10-CM

## 2023-02-13 DIAGNOSIS — R33.8 POSTOPERATIVE RETENTION OF URINE: Primary | ICD-10-CM

## 2023-02-13 PROCEDURE — 99212 OFFICE O/P EST SF 10 MIN: CPT

## 2023-02-13 NOTE — PROCEDURES
Spoke with patient, states she is voiding and emptying well. Patient informed to call office with signs of retention or UTI symptoms. Patient verbalized understanding.

## 2023-02-13 NOTE — PATIENT INSTRUCTIONS
Voiding Trial Instructions  You have passed your voiding trial at 10:00am.  Please make sure you are drinking some water today, drink to thirst, do not over push water. You can take your Motrin/Ibuprofen to help with any swelling from the catheter. It is important to try and empty your bladder every two hours during the day. Try and empty again at 12noon. If you are unable to empty, try and drink a glass of water and try again 30-60 minutes later. If you have been unable to empty your bladder by 1:00pm, which is 3 hours after leaving the office, you will most likely be uncomfortable and you will need to come back into the office. If it is after 4pm, go to an urgent care or emergency room to have a catheter placed again. Please call our office at [phone] if you have any questions or concerns.

## 2023-03-02 ENCOUNTER — TELEPHONE (OUTPATIENT)
Dept: UROLOGY | Facility: CLINIC | Age: 58
End: 2023-03-02

## 2023-03-02 NOTE — TELEPHONE ENCOUNTER
TC from patient asking if she can take a bath to help soothe her sore joints. Pt s/p 2/7/2023 Repair of enterocele, uterosacral vaginal vault suspension, anterior colporrhaphy, posterior colporrhaphy, retropubic midurethral sling, cystoscopy. Pt is very active and has been walking and doing light Yoga. Asking if she can take a bath and continue exercising. Pt informed that yes she can continue what she is doing, and she can take a bath to soothe her joints. Has 6 week follow up appt already scheduled.

## 2023-03-15 ENCOUNTER — LAB ENCOUNTER (OUTPATIENT)
Dept: LAB | Age: 58
End: 2023-03-15
Attending: OBSTETRICS & GYNECOLOGY
Payer: COMMERCIAL

## 2023-03-15 ENCOUNTER — TELEPHONE (OUTPATIENT)
Dept: UROLOGY | Facility: CLINIC | Age: 58
End: 2023-03-15

## 2023-03-15 DIAGNOSIS — R30.0 DYSURIA: ICD-10-CM

## 2023-03-15 DIAGNOSIS — R30.0 DYSURIA: Primary | ICD-10-CM

## 2023-03-15 PROCEDURE — 87086 URINE CULTURE/COLONY COUNT: CPT

## 2023-03-15 PROCEDURE — 87077 CULTURE AEROBIC IDENTIFY: CPT

## 2023-03-15 NOTE — TELEPHONE ENCOUNTER
Pt LM on RN line. Spoke with patient, states she kumari been having some pain on/off for the past 5 days and also c/o vaginal bleeding. Patient also c/o cloudy urine and pain with urination. Patient is post op (2/7/23) USVVS, APER, MUS, cysto. Informed patient that it is still normal to have some suprapubic pain and bleeding up until 8 weeks post op. Informed her that bleeding can increase as activity level increases. Patient states she has been going on walks but nothing too long. Informed patient that an order will be placed for urine culture and patient should go to [facility] lab to give specimen. Patient verbalized understanding and agreeable to plan.

## 2023-03-17 ENCOUNTER — TELEPHONE (OUTPATIENT)
Dept: UROLOGY | Facility: CLINIC | Age: 58
End: 2023-03-17

## 2023-03-17 RX ORDER — CEPHALEXIN 500 MG/1
500 CAPSULE ORAL 2 TIMES DAILY
Qty: 14 CAPSULE | Refills: 0 | Status: SHIPPED | OUTPATIENT
Start: 2023-03-17 | End: 2023-03-24

## 2023-03-17 NOTE — TELEPHONE ENCOUNTER
Felicity Anderson --  Dr. Viv Gurrola, Keflex 500mg PO BID X 7 days for positive urine culture. LVM for patient informing her of RX sent to pharmacy on file.

## 2023-03-24 ENCOUNTER — OFFICE VISIT (OUTPATIENT)
Dept: UROLOGY | Facility: CLINIC | Age: 58
End: 2023-03-24
Attending: OBSTETRICS & GYNECOLOGY
Payer: COMMERCIAL

## 2023-03-24 VITALS — BODY MASS INDEX: 21.9 KG/M2 | HEIGHT: 61 IN | TEMPERATURE: 98 F | WEIGHT: 116 LBS

## 2023-03-24 DIAGNOSIS — Z48.816 AFTERCARE FOLLOWING SURGERY OF THE GENITOURINARY SYSTEM: Primary | ICD-10-CM

## 2023-03-24 DIAGNOSIS — L92.9 GRANULATION TISSUE: ICD-10-CM

## 2023-03-24 DIAGNOSIS — N32.9 LESION OF BLADDER: ICD-10-CM

## 2023-03-24 PROCEDURE — 99212 OFFICE O/P EST SF 10 MIN: CPT

## 2023-06-09 ENCOUNTER — TELEPHONE (OUTPATIENT)
Dept: UROLOGY | Facility: CLINIC | Age: 58
End: 2023-06-09

## 2023-06-09 NOTE — TELEPHONE ENCOUNTER
LM on pt VM saying [facility] called for refill of estradiol. Request clarification whether pt wants tablets or cream. Provided nurse line number to call back.

## 2023-06-21 ENCOUNTER — OFFICE VISIT (OUTPATIENT)
Dept: UROLOGY | Facility: CLINIC | Age: 58
End: 2023-06-21
Attending: OBSTETRICS & GYNECOLOGY
Payer: COMMERCIAL

## 2023-06-21 VITALS — TEMPERATURE: 98 F | HEIGHT: 61 IN | BODY MASS INDEX: 21.9 KG/M2 | WEIGHT: 116 LBS

## 2023-06-21 DIAGNOSIS — M62.89 PELVIC FLOOR TENSION: ICD-10-CM

## 2023-06-21 DIAGNOSIS — N94.10 DYSPAREUNIA IN FEMALE: Primary | ICD-10-CM

## 2023-06-21 DIAGNOSIS — N32.9 LESION OF BLADDER: ICD-10-CM

## 2023-06-21 PROBLEM — N81.6 CYSTOCELE WITH RECTOCELE: Status: RESOLVED | Noted: 2022-01-14 | Resolved: 2023-06-21

## 2023-06-21 PROBLEM — N81.10 CYSTOCELE WITH RECTOCELE: Status: RESOLVED | Noted: 2022-01-14 | Resolved: 2023-06-21

## 2023-06-21 PROCEDURE — 99212 OFFICE O/P EST SF 10 MIN: CPT

## 2023-10-30 ENCOUNTER — TELEPHONE (OUTPATIENT)
Dept: PHYSICAL THERAPY | Facility: HOSPITAL | Age: 58
End: 2023-10-30

## 2023-11-01 ENCOUNTER — OFFICE VISIT (OUTPATIENT)
Dept: PHYSICAL THERAPY | Facility: HOSPITAL | Age: 58
End: 2023-11-01
Attending: OBSTETRICS & GYNECOLOGY
Payer: COMMERCIAL

## 2023-11-01 DIAGNOSIS — N94.10 DYSPAREUNIA IN FEMALE: Primary | ICD-10-CM

## 2023-11-01 PROCEDURE — 97112 NEUROMUSCULAR REEDUCATION: CPT

## 2023-11-01 PROCEDURE — 97140 MANUAL THERAPY 1/> REGIONS: CPT

## 2023-11-01 PROCEDURE — 97162 PT EVAL MOD COMPLEX 30 MIN: CPT

## 2023-11-01 NOTE — PROGRESS NOTES
MUSCULOSKELETAL AND PELVIC FLOOR EVALUATION:     Diagnosis:   Dyspareunia in female (N94.10)       Referring Provider: Yanelis Bauer  Date of Evaluation:    2023    Precautions:  None Next MD visit:   none scheduled  Date of Surgery: n/a     PATIENT SUMMARY   Benita Tomlin is a 66year old female who presents to therapy today with complaints of vaginal pain. She had a cystocele, rectocele, and enterocele repair surgery on 23. She reports that many of her previous symptoms have improved since the surgery, but she does still have pain with intercourse. She previously felt that she had to hold her pelvic floor in a contracted position when she was having prolapse symptoms. Now, she feels that she continues to hold tension and has difficulty relaxing her PFM. She does have a physical job at the [employer]; reports walking up to 18 miles per day while at work. She does do yoga, which makes a huge difference, but it is more challenging to find time for that since returning to work. Pain with intercourse is both with superficial and deep penetration. She has two particular spots that feel really sore. She denies urinary symptoms or constipation. Current symptoms include: vaginal pain    Pt describes pain level: current 0/10, best 0/10, worst 7/10. Quality: sharp    Pregnant Now: No  Obstetrical/Gynecological history: : 11  Para: 6  Occupation/Activities: [employer]  PFDI-20: Not completed     Kellee Arenas describes prior level of function as having no issues with painful intercourse prior to a few years ago. Pt goals include resolve pain with sex. Past medical history was reviewed with Kellee Arenas.  She has a past medical history of Allergic rhinitis, Anxiety (), ASCUS of cervix with negative high risk HPV (2017), Carpal tunnel syndrome (2013), Colitis, Dense breasts, Disorder of thyroid, Enlarged uterus, Enthesopathy of wrist and carpus (2013), Environmental allergies, Essential hypertension, GERD (gastroesophageal reflux disease) (2016), H/O screening mammography (02/03/2022), High blood pressure, motion sickness, Hypothyroid (04/16/2012), Insomnia (2020), Late effect of fracture of upper extremities (06/20/2013), Lymphocytic colitis (07/2016), Nontoxic multinodular goiter (06/25/2013), Ovarian cyst, Pap smear for cervical cancer screening (07/28/2020), PONV (postoperative nausea and vomiting), PTSD (post-traumatic stress disorder) (2020), Screening mammogram, encounter for (02/03/2022), Status post laparoscopic hysterectomy (08/14/2020), Visual impairment, Vitamin D deficiency (2016), and Well woman exam with routine gynecological exam (01/13/2022). URINARY HABITS  WNL    BOWEL HABITS  WNL    SEXUAL HEALTH STATUS  Marinoff Scale: 2  History of Sexual Abuse: NA  Sexual [place] Status: Active but often unable due to pain  Pain with initial and/or deep penetration: Both, deep>superficial  Pain with pelvic exam/tampon use: Yes    Amelia Hale presents to physical therapy evaluation with primary c/o vaginal pain. The results of the objective tests and measures show increased BLQ abdominal tension, decreased R hip flexor length, impaired eccentric lengthening of PFM, and TTP in R>L and deep>superficial pelvic floor musculature. Functional deficits include but are not limited to impaired ability to engage in intercourse with her partner due to pain. Signs and symptoms are consistent with diagnosis of dyspareunia. Pt and PT discussed evaluation findings, pathology, POC and HEP. Pt voiced understanding and performs HEP correctly without reported pain. Skilled Pelvic Physical Therapy is medically necessary to address the above impairments and reach functional goals. OBJECTIVE:   Posture: WNL  Pelvic Alignment: WNL  Deep Tendon Reflexes:  NT  Gait: pt ambulates on level ground with normal mechanics.      Scars (location/surgery): NA  Abdominal Wall Integrity: Increased tension in BLQ of abdomen      Range Of Motion  Lumbar AROM screen: WNL  LE AROM screen: grossly WNL    Strength (MMT) 5/5 JOSESITO LE except 4/5 hip abduction B  Transverse Abdominis: 4-/5    Flexibility Summary: WNL JOSESITO LE except significant R hip flexor tightness       Informed consent for internal pelvic evaluation given: Yes    External Observation:   Voluntary contraction: present   Voluntary relaxation: absent  Involuntary contraction: present  Involuntary relaxation: absent    Mons pubis: WNL  Labia majora: WNL  Labia minora: WNL  Urethral meatus: WNL  Introitus: WNL  Perineal body: WNL      Internal Examination     Pelvic Floor Muscle strength: (PERF= Power/Endurance/Reps/Fast) MMT: 2+/5  External Anal Sphincter: NT  Accessory Muscle Use: none    Tissue Laxity Test:  Anterior Wall: WNL  Posterior Wall: WNL  Apical: WNL    Eccentric lengthening contraction: impaired  Bearing down Valsalva maneuver (2-3x): NT    Internal Palpation: WNL except Pubococcygeus/Pubovaginalis R>L minimal restriction and tenderness  Iliococcygeus R>L moderate restriction and tenderness  Coccygeus R>L moderate restriction and tenderness  Obturator Internus R>L moderate restriction and tenderness    Today's Treatment and Response:   Patient education was provided on objective findings of external and internal evaluation and expectations with treatment outcomes. Educated on pelvic anatomy and function with diagrams and pelvic model and diaphragmatic breathing for PNS activation and pelvic floor relaxation     Manual: 15'  Internal assessment performed with gross strength assessment, as well as assessment of ability to relax/elongate muscles. Brief TrP release applied bilaterally. Neuro: 13'  Discussed psychosocial aspects of dyspareunia and utility of down-training PFM via diaphragmatic breathing and lumbopelvic stretching exercise.  Cued in diaphragmatic breathing in supine and discussed use of dilators and/or wand to address tender spots in PFM        Charges: PT Eval Moderate Complexity, neuro x 1, manual x 1      Total Timed Treatment: 27 min     Total Treatment Time: 45 min     Based on clinical rationale and outcome measures, this evaluation involved Moderate Complexity decision making due to 1-2 personal factors/comorbidities, 3 body structures involved/activity limitations, and evolving symptoms including pelvic pain  PLAN OF CARE:    Goals: (to be met in 10 visits)  Patient will demonstrate optimal PFM elongation with coordinated breathing x 10 reps to alleviate PFM pain and muscle tension. Patient will demonstrate normalized tone and minimal pain onset (</= 2/10) with internal myofascial release for increased tolerance to gynecological exam.   Patient will demonstrate R hip flexor muscle length WNL to alleviate tension in support structures of pelvic floor musculature. Patient will report improved volitional PFM relaxation ability throughout the day to decrease tension holding pattern for improved PFM health and function. Patient will report adherence to HEP for continued exercise benefits following cessation of PT. Frequency / Duration: Patient will be seen for 1-2 x/week or a total of 10 visits over a 90 day period. Treatment will include: Manual Therapy, Neuromuscular Re-education, Self-Care Home Management, Therapeutic Activities, Therapeutic Exercise, and Home Exercise Program instruction     Education or treatment limitation: None  Rehab Potential:good      Patient/Family/Caregiver was advised of these findings, precautions, and treatment options and has agreed to actively participate in planning and for this course of care. Thank you for your referral. Please co-sign or sign and return this letter via fax as soon as possible to [phone].  If you have any questions, please contact me at Dept: [phone]    Sincerely,  Electronically signed by therapist: Sergio Yanez PT  Physician's certification required: Yes  I certify the need for these services furnished under this plan of treatment and while under my care.     X___________________________________________________ Date____________________    Certification From: 56/0/9505  To:1/30/2024

## 2023-11-03 ENCOUNTER — TELEPHONE (OUTPATIENT)
Dept: PHYSICAL THERAPY | Facility: HOSPITAL | Age: 58
End: 2023-11-03

## 2023-11-06 ENCOUNTER — TELEPHONE (OUTPATIENT)
Dept: PHYSICAL THERAPY | Facility: HOSPITAL | Age: 58
End: 2023-11-06

## 2023-11-08 ENCOUNTER — OFFICE VISIT (OUTPATIENT)
Dept: PHYSICAL THERAPY | Facility: HOSPITAL | Age: 58
End: 2023-11-08
Attending: OBSTETRICS & GYNECOLOGY
Payer: COMMERCIAL

## 2023-11-08 PROCEDURE — 97140 MANUAL THERAPY 1/> REGIONS: CPT

## 2023-11-08 NOTE — PROGRESS NOTES
Diagnosis:   Dyspareunia in female (N94.10)          Referring Provider: Antonieta Dempsey  Date of Evaluation:    11/1/23    Precautions:   cystocele, rectocele, and enterocele repair surgery on 2/7/23 Next MD visit:   none scheduled  Date of Surgery: n/a   Insurance Primary/Secondary: [employer] [place] / N/A     # Auth Visits: 10 per insurance  through 1/29/24    Subjective: Reports that she did get her wand and has used it once so far. Pain: 4/10      Objective:     Flexibility Summary: WNL JOSESITO LE except significant R hip flexor tightness     Internal Examination      Pelvic Floor Muscle strength: (PERF= Power/Endurance/Reps/Fast) MMT: 2+/5  External Anal Sphincter: NT  Accessory Muscle Use: none     Tissue Laxity Test:  Anterior Wall: WNL  Posterior Wall: WNL  Apical: WNL     Eccentric lengthening contraction: impaired  Bearing down Valsalva maneuver (2-3x): NT     Internal Palpation: WNL except Pubococcygeus/Pubovaginalis R>L minimal restriction and tenderness  Iliococcygeus R>L moderate restriction and tenderness  Coccygeus R>L moderate restriction and tenderness  Obturator Internus R>L moderate restriction and tenderness      Assessment: Internal work today revealed tenderness and tension in all three muscular layers bilaterally. Tenderness did dissipate somewhat with myofascial release. Encouraged patient to perform wand internal releases for up to 15 mins per day with gentle sustained pressure holds to alleviate tension and pain. Plan to continue with internal work and progressive down-training exercise to address remaining symptoms. Goals:   Patient will demonstrate optimal PFM elongation with coordinated breathing x 10 reps to alleviate PFM pain and muscle tension.   Patient will demonstrate normalized tone and minimal pain onset (</= 2/10) with internal myofascial release for increased tolerance to gynecological exam.   Patient will demonstrate R hip flexor muscle length WNL to alleviate tension in support structures of pelvic floor musculature. Patient will report improved volitional PFM relaxation ability throughout the day to decrease tension holding pattern for improved PFM health and function. Patient will report adherence to HEP for continued exercise benefits following cessation of PT. Plan: Continue with internal work and breath work. Date: 11/8/2023  TX#: 2/10 Date:                 TX#: 3/ Date:                 TX#: 4/ Date:                 TX#: 5/ Date:    Tx#: 6/   Manual: 36'  Internal myofascial release to all three layers bilaterally    Hip flexor release on R    Brief R OI assessment                            HEP: abilio MERLOS use for 15 mins    Charges: manual x 3       Total Timed Treatment: 40 min  Total Treatment Time: 43 min

## 2023-11-15 ENCOUNTER — OFFICE VISIT (OUTPATIENT)
Dept: PHYSICAL THERAPY | Facility: HOSPITAL | Age: 58
End: 2023-11-15
Attending: OBSTETRICS & GYNECOLOGY
Payer: COMMERCIAL

## 2023-11-15 PROCEDURE — 97112 NEUROMUSCULAR REEDUCATION: CPT

## 2023-11-15 PROCEDURE — 97140 MANUAL THERAPY 1/> REGIONS: CPT

## 2023-11-15 NOTE — PROGRESS NOTES
Diagnosis:   Dyspareunia in female (N94.10)          Referring Provider: Harjit Samantha  Date of Evaluation:    11/1/23    Precautions:   cystocele, rectocele, and enterocele repair surgery on 2/7/23 Next MD visit:   none scheduled  Date of Surgery: n/a   Insurance Primary/Secondary: [employer] [place] PPO / N/A     # Auth Visits: 10 per insurance  through 1/29/24    Subjective: Reports that she has been able to do some stretching, but she has not been able to use her wand this week. She feels more externally than internally tight today. Pain: 4/10      Objective:     Flexibility Summary: WNL JOSESITO LE except significant R hip flexor tightness     Internal Examination      Pelvic Floor Muscle strength: (PERF= Power/Endurance/Reps/Fast) MMT: 2+/5  External Anal Sphincter: NT  Accessory Muscle Use: none     Tissue Laxity Test:  Anterior Wall: WNL  Posterior Wall: WNL  Apical: WNL     Eccentric lengthening contraction: impaired  Bearing down Valsalva maneuver (2-3x): NT     Internal Palpation: WNL except Pubococcygeus/Pubovaginalis R>L minimal restriction and tenderness  Iliococcygeus R>L moderate restriction and tenderness  Coccygeus R>L moderate restriction and tenderness  Obturator Internus R>L moderate restriction and tenderness      Assessment: Continued with internal myofascial work followed by R sided abdominal and lateral hip STM, as patient continues to experience tenderness and tension in aforementioned regions. Followed with supine and sidelying breath work with emphasis on max inhalation to promote PSNS activation. Pt tolerated all interventions well. Goals:   Patient will demonstrate optimal PFM elongation with coordinated breathing x 10 reps to alleviate PFM pain and muscle tension.   Patient will demonstrate normalized tone and minimal pain onset (</= 2/10) with internal myofascial release for increased tolerance to gynecological exam.   Patient will demonstrate R hip flexor muscle length WNL to alleviate tension in support structures of pelvic floor musculature. Patient will report improved volitional PFM relaxation ability throughout the day to decrease tension holding pattern for improved PFM health and function. Patient will report adherence to HEP for continued exercise benefits following cessation of PT. Plan: Continue with internal work and breath work. Date: 11/8/2023  TX#: 2/10 Date: 11/15/23                TX#: 3/10 Date:                 TX#: 4/ Date:                 TX#: 5/ Date:    Tx#: 6/   Manual: 36'  Internal myofascial release to all three layers bilaterally    Hip flexor release on R    Brief R OI assessment Manual: 40'  Internal myofascial release to bilateral deep ms layers     Abdominal/Lateral hip STM/myofascial release        Neuro: 10'  Supine segmental breathing with max inhalation x 3 mins     SL segmental breathing with max inhalation x 3 mins    Seated segmental breathing with max inhalation x 4 mins                            HEP: DB, wand use for 15 mins    Charges: manual x 3 , neuro x 1      Total Timed Treatment: 54 min  Total Treatment Time: 54 min

## 2023-11-20 ENCOUNTER — OFFICE VISIT (OUTPATIENT)
Dept: PHYSICAL THERAPY | Facility: HOSPITAL | Age: 58
End: 2023-11-20
Attending: OBSTETRICS & GYNECOLOGY
Payer: COMMERCIAL

## 2023-11-20 PROCEDURE — 97140 MANUAL THERAPY 1/> REGIONS: CPT

## 2023-11-20 NOTE — PROGRESS NOTES
Diagnosis:   Dyspareunia in female (N94.10)          Referring Provider: Jb Alfonso  Date of Evaluation:    11/1/23    Precautions:   cystocele, rectocele, and enterocele repair surgery on 2/7/23 Next MD visit:   none scheduled  Date of Surgery: n/a   Insurance Primary/Secondary: [employer] [place] PPO / N/A     # Auth Visits: 10 per insurance  through 1/29/24    Subjective: Reports that she has been working a lot the past couple weeks. She has to stand constantly for her job. She has been experiencing pressure and discomfort in her pelvic floor. She is trying to be very cognizant of her lifting mechanics. Pain: 4/10      Objective:     Flexibility Summary: WNL JOSESITO LE except significant R hip flexor tightness     Internal Examination      Pelvic Floor Muscle strength: (PERF= Power/Endurance/Reps/Fast) MMT: 2+/5  External Anal Sphincter: NT  Accessory Muscle Use: none     Tissue Laxity Test:  Anterior Wall: WNL  Posterior Wall: WNL  Apical: WNL     Eccentric lengthening contraction: impaired  Bearing down Valsalva maneuver (2-3x): NT     Internal Palpation: WNL except Pubococcygeus/Pubovaginalis R>L minimal restriction and tenderness  Iliococcygeus R>L moderate restriction and tenderness  Coccygeus R>L moderate restriction and tenderness  Obturator Internus R>L moderate restriction and tenderness    11/20: TrP in [place] muscle with moderate TTP      Assessment: Performed soft tissue work internally followed by in the R abdomen and RLE due to increased pain and tension today from prolonged standing activity while favoring the R side. Briefly reviewed optimal lifting mechanics with breath regulation when performing heaving lifting tasks. Plan to continue to review optimal lifting with demonstration and guided practice next session. Goals:   Patient will demonstrate optimal PFM elongation with coordinated breathing x 10 reps to alleviate PFM pain and muscle tension.   Patient will demonstrate normalized tone and minimal pain onset (</= 2/10) with internal myofascial release for increased tolerance to gynecological exam.   Patient will demonstrate R hip flexor muscle length WNL to alleviate tension in support structures of pelvic floor musculature. Patient will report improved volitional PFM relaxation ability throughout the day to decrease tension holding pattern for improved PFM health and function. Patient will report adherence to HEP for continued exercise benefits following cessation of PT. Plan: Continue with internal work and breath work. Review lifting mechanics. Date: 11/8/2023  TX#: 2/10 Date: 11/15/23                TX#: 3/10 Date: 11/20/23                TX#: 4/10 Date:                 TX#: 5/ Date:    Tx#: 6/   Manual: 36'  Internal myofascial release to all three layers bilaterally    Hip flexor release on R    Brief R OI assessment Manual: 40'  Internal myofascial release to bilateral deep ms layers     Abdominal/Lateral hip STM/myofascial release  Manual: 43'  Internal myofascial release to middle and deep ms layers B    RLQ of abdomen, as well as R anterior/lateral hip, quad, gastroc, tib anterior STM/myofascial work       Neuro: 10'  Supine segmental breathing with max inhalation x 3 mins     SL segmental breathing with max inhalation x 3 mins    Seated segmental breathing with max inhalation x 4 mins                            HEP: DB, wand use for 15 mins    Charges: manual x 3     Total Timed Treatment: 42 min  Total Treatment Time: 44 min

## 2023-11-22 ENCOUNTER — APPOINTMENT (OUTPATIENT)
Dept: PHYSICAL THERAPY | Facility: HOSPITAL | Age: 58
End: 2023-11-22
Attending: OBSTETRICS & GYNECOLOGY
Payer: COMMERCIAL

## 2023-11-29 ENCOUNTER — OFFICE VISIT (OUTPATIENT)
Dept: PHYSICAL THERAPY | Facility: HOSPITAL | Age: 58
End: 2023-11-29
Attending: OBSTETRICS & GYNECOLOGY
Payer: COMMERCIAL

## 2023-11-29 PROCEDURE — 97140 MANUAL THERAPY 1/> REGIONS: CPT

## 2023-11-29 PROCEDURE — 97112 NEUROMUSCULAR REEDUCATION: CPT

## 2023-11-30 NOTE — PROGRESS NOTES
Diagnosis:   Dyspareunia in female (N94.10)          Referring Provider: Rudy Hurtado  Date of Evaluation:    11/1/23    Precautions:   cystocele, rectocele, and enterocele repair surgery on 2/7/23 Next MD visit:   none scheduled  Date of Surgery: n/a   Insurance Primary/Secondary: [employer] [place] / N/A     # Auth Visits: 10 per insurance  through 1/29/24    Subjective: Reports that symptoms have been okay despite how busy and active she's been. She has been finding time to do some bike riding and yoga stretches. Pain: 4/10      Objective:     Flexibility Summary: WNL JOSESITO LE except significant R hip flexor tightness     Internal Examination      Pelvic Floor Muscle strength: (PERF= Power/Endurance/Reps/Fast) MMT: 2+/5  External Anal Sphincter: NT  Accessory Muscle Use: none     Tissue Laxity Test:  Anterior Wall: WNL  Posterior Wall: WNL  Apical: WNL     Eccentric lengthening contraction: impaired  Bearing down Valsalva maneuver (2-3x): NT     Internal Palpation: WNL except Pubococcygeus/Pubovaginalis R>L minimal restriction and tenderness  Iliococcygeus R>L moderate restriction and tenderness  Coccygeus R>L moderate restriction and tenderness  Obturator Internus R>L moderate restriction and tenderness    11/20: TrP in [place] muscle with moderate TTP      Assessment: Patient presented with increased PFM tension and TTP today in iliococcygeus on the R, as well as puborectalis, likely from increased standing in the cold for her job this past week. Following internal work, reviewed optimal mechanics with lifting and carrying heavy objects, as well as optimal breath regulation with PFM contract/relax. Plan to continue with breath work and internal work, as needed to address remaining symptoms. Goals:   Patient will demonstrate optimal PFM elongation with coordinated breathing x 10 reps to alleviate PFM pain and muscle tension.   Patient will demonstrate normalized tone and minimal pain onset (</= 2/10) with internal myofascial release for increased tolerance to gynecological exam.   Patient will demonstrate R hip flexor muscle length WNL to alleviate tension in support structures of pelvic floor musculature. Patient will report improved volitional PFM relaxation ability throughout the day to decrease tension holding pattern for improved PFM health and function. Patient will report adherence to HEP for continued exercise benefits following cessation of PT. Plan: Continue with internal work and breath work. Follow up on dilator use. Date: 11/8/2023  TX#: 2/10 Date: 11/15/23                TX#: 3/10 Date: 11/20/23                TX#: 4/10 Date:  11/29/23               TX#: 5/10 Date:    Tx#: 6/   Manual: 36'  Internal myofascial release to all three layers bilaterally    Hip flexor release on R    Brief R OI assessment Manual: 40'  Internal myofascial release to bilateral deep ms layers     Abdominal/Lateral hip STM/myofascial release  Manual: 43'  Internal myofascial release to middle and deep ms layers B    RLQ of abdomen, as well as R anterior/lateral hip, quad, gastroc, tib anterior STM/myofascial work  Manual: 30'  Internal myofascial release to middle and deep ms layers B     Neuro: 10'  Supine segmental breathing with max inhalation x 3 mins     SL segmental breathing with max inhalation x 3 mins    Seated segmental breathing with max inhalation x 4 mins          Neuro: 12'  5lb box lifts with exhale/contract prior to lifting x 8    5lb box lifts and carries with PFM pre-activation x 8    Seated DB with PFM lengthening on inhale with diagram for visual cueing x 3 mins                          HEP: DB, wand use for 15 mins    Charges: manual x 2, neuro x 1     Total Timed Treatment: 42 min  Total Treatment Time: 44 min

## 2023-12-06 ENCOUNTER — OFFICE VISIT (OUTPATIENT)
Dept: PHYSICAL THERAPY | Facility: HOSPITAL | Age: 58
End: 2023-12-06
Attending: OBSTETRICS & GYNECOLOGY
Payer: COMMERCIAL

## 2023-12-06 PROCEDURE — 97110 THERAPEUTIC EXERCISES: CPT

## 2023-12-06 PROCEDURE — 97140 MANUAL THERAPY 1/> REGIONS: CPT

## 2023-12-07 NOTE — PROGRESS NOTES
Diagnosis:   Dyspareunia in female (N94.10)          Referring Provider: Scralett Khan  Date of Evaluation:    11/1/23    Precautions:   cystocele, rectocele, and enterocele repair surgery on 2/7/23 Next MD visit:   none scheduled  Date of Surgery: n/a   Insurance Primary/Secondary: [employer] [place] / N/A     # Auth Visits: 10 per insurance  through 1/29/24    Subjective: Reports that she has not been feeling much pelvic pressure or pain despite being incredibly busy at work. She does notice some R hip tightness and soreness though. Pain: 4/10      Objective:     Flexibility Summary: WNL JOSESITO LE except significant R hip flexor tightness     Internal Examination      Pelvic Floor Muscle strength: (PERF= Power/Endurance/Reps/Fast) MMT: 2+/5  External Anal Sphincter: NT  Accessory Muscle Use: none     Tissue Laxity Test:  Anterior Wall: WNL  Posterior Wall: WNL  Apical: WNL     Eccentric lengthening contraction: impaired  Bearing down Valsalva maneuver (2-3x): NT     Internal Palpation: WNL except Pubococcygeus/Pubovaginalis R>L minimal restriction and tenderness  Iliococcygeus R>L moderate restriction and tenderness  Coccygeus R>L moderate restriction and tenderness  Obturator Internus R>L moderate restriction and tenderness    11/20: TrP in [place] muscle with moderate TTP      Assessment: Patient with notable tension and TTP in R hip today, specifically along TFL/IT band, hip flexors, and quadriceps. Following soft tissue work, cued in stretches for aforementioned tight structures and provided handout. Progress as tolerated to alleviate remaining symptoms. Goals:   Patient will demonstrate optimal PFM elongation with coordinated breathing x 10 reps to alleviate PFM pain and muscle tension.   Patient will demonstrate normalized tone and minimal pain onset (</= 2/10) with internal myofascial release for increased tolerance to gynecological exam.   Patient will demonstrate R hip flexor muscle length WNL to alleviate tension in support structures of pelvic floor musculature. Patient will report improved volitional PFM relaxation ability throughout the day to decrease tension holding pattern for improved PFM health and function. Patient will report adherence to HEP for continued exercise benefits following cessation of PT. Plan: Continue with internal work and breath work. Follow up on dilator use. Continue with stretching.    Date: 11/8/2023  TX#: 2/10 Date: 11/15/23                TX#: 3/10 Date: 11/20/23                TX#: 4/10 Date:  11/29/23               TX#: 5/10 Date: 12/6/23  Tx#: 6/10   Manual: 40'  Internal myofascial release to all three layers bilaterally    Hip flexor release on R    Brief R OI assessment Manual: 40'  Internal myofascial release to bilateral deep ms layers     Abdominal/Lateral hip STM/myofascial release  Manual: 43'  Internal myofascial release to middle and deep ms layers B    RLQ of abdomen, as well as R anterior/lateral hip, quad, gastroc, tib anterior STM/myofascial work  Manual: 30'  Internal myofascial release to middle and deep ms layers B Manual: 32'  STM/myofascial release to lower abdomen, hip flexors, R quad, R proximal IT band    Neuro: 10'  Supine segmental breathing with max inhalation x 3 mins     SL segmental breathing with max inhalation x 3 mins    Seated segmental breathing with max inhalation x 4 mins          Neuro: 12'  5lb box lifts with exhale/contract prior to lifting x 8    5lb box lifts and carries with PFM pre-activation x 8    Seated DB with PFM lengthening on inhale with diagram for visual cueing x 3 mins         There-ex: 18'  - Supine ITB Stretch with Strap  - 1 x daily - 7 x weekly - 2 reps - 1 min hold  - Quadruped Rock Back into [employer] Up  - 1 x daily - 7 x weekly - 10 reps  - Supine Figure 4 Piriformis Stretch  - 1 x daily - 7 x weekly - 10 reps  - Half Kneeling Hip Flexor Stretch with Sidebend  - 1 x daily - 7 x weekly - 2 sets - 1 min hold  - Half Kneeling Hip Flexor Stretch  - 1 x daily - 7 x weekly - 2 sets - 1 min hold                 HEP: DB, wand use for 15 mins  Access Code: Nilsa Pinedo  Date: 12/08/2023  - Supine ITB Stretch with Strap  - 1 x daily - 7 x weekly - 2 reps - 1 min hold  - Quadruped Rock Back into [employer] Up  - 1 x daily - 7 x weekly - 10 reps  - Supine Figure 4 Piriformis Stretch  - 1 x daily - 7 x weekly - 10 reps  - Half Kneeling Hip Flexor Stretch with Sidebend  - 1 x daily - 7 x weekly - 2 sets - 1 min hold  - Half Kneeling Hip Flexor Stretch  - 1 x daily - 7 x weekly - 2 sets - 1 min hold    Charges: manual x 2, ther-ex x 1     Total Timed Treatment: 44 min  Total Treatment Time: 44 min

## 2023-12-13 ENCOUNTER — APPOINTMENT (OUTPATIENT)
Dept: PHYSICAL THERAPY | Facility: HOSPITAL | Age: 58
End: 2023-12-13
Attending: OBSTETRICS & GYNECOLOGY
Payer: COMMERCIAL

## 2023-12-20 ENCOUNTER — OFFICE VISIT (OUTPATIENT)
Dept: PHYSICAL THERAPY | Facility: HOSPITAL | Age: 58
End: 2023-12-20
Attending: OBSTETRICS & GYNECOLOGY
Payer: COMMERCIAL

## 2023-12-20 PROCEDURE — 97140 MANUAL THERAPY 1/> REGIONS: CPT

## 2023-12-20 PROCEDURE — 97110 THERAPEUTIC EXERCISES: CPT

## 2023-12-20 NOTE — PROGRESS NOTES
Diagnosis:   Dyspareunia in female (N94.10)          Referring Provider: Scarlett Khan  Date of Evaluation:    11/1/23    Precautions:   cystocele, rectocele, and enterocele repair surgery on 2/7/23 Next MD visit:   none scheduled  Date of Surgery: n/a   Insurance Primary/Secondary: [employer] [place] PPO / N/A     # Auth Visits: 10 per insurance  through 1/29/24    Subjective: Reports that she has had a recurrence of colitis symptoms in the last two weeks. She thinks she's likely low on vitamin D. She has been experiencing frequent loose stools. She is defecating 5-7x/day. Yoga is helping with her hip pain. She has also been cycling indoor, which she is tolerating well. Pain: 5/10      Objective:     Flexibility Summary: WNL JOSESITO LE except significant R hip flexor tightness     Internal Examination      Pelvic Floor Muscle strength: (PERF= Power/Endurance/Reps/Fast) MMT: 2+/5  External Anal Sphincter: NT  Accessory Muscle Use: none     Tissue Laxity Test:  Anterior Wall: WNL  Posterior Wall: WNL  Apical: WNL     Eccentric lengthening contraction: impaired  Bearing down Valsalva maneuver (2-3x): NT     Internal Palpation: WNL except Pubococcygeus/Pubovaginalis R>L minimal restriction and tenderness  Iliococcygeus R>L moderate restriction and tenderness  Coccygeus R>L moderate restriction and tenderness  Obturator Internus R>L moderate restriction and tenderness    11/20: TrP in [place] muscle with moderate TTP      Assessment: Continued with soft tissue work on abdomen and RLE to assist with pain management. Also performed CCW ILU massage to slow stool transit time due to recent recurrence of frequent loose stools. Continued with hip stretching and thoracolumbar mobility, which pt tolerated well. Progress as tolerated to address remaining symptoms. Goals:   Patient will demonstrate optimal PFM elongation with coordinated breathing x 10 reps to alleviate PFM pain and muscle tension.   Patient will demonstrate normalized tone and minimal pain onset (</= 2/10) with internal myofascial release for increased tolerance to gynecological exam.   Patient will demonstrate R hip flexor muscle length WNL to alleviate tension in support structures of pelvic floor musculature. Patient will report improved volitional PFM relaxation ability throughout the day to decrease tension holding pattern for improved PFM health and function. Patient will report adherence to HEP for continued exercise benefits following cessation of PT. Plan: Continue with internal work and breath work. Follow up on dilator use. Continue with stretching.    Date: 11/8/2023  TX#: 2/10 Date: 11/15/23                TX#: 3/10 Date: 11/20/23                TX#: 4/10 Date:  11/29/23               TX#: 5/10 Date: 12/6/23  Tx#: 6/10 Date: 12/20/23  TX#: 7/10   Manual: 36'  Internal myofascial release to all three layers bilaterally    Hip flexor release on R    Brief R OI assessment Manual: 40'  Internal myofascial release to bilateral deep ms layers     Abdominal/Lateral hip STM/myofascial release  Manual: 43'  Internal myofascial release to middle and deep ms layers B    RLQ of abdomen, as well as R anterior/lateral hip, quad, gastroc, tib anterior STM/myofascial work  Manual: 30'  Internal myofascial release to middle and deep ms layers B Manual: 32'  STM/myofascial release to lower abdomen, hip flexors, R quad, R proximal IT band Manual: 29'  STM/myofascial release to lower abdomen, hip flexors, R quad, R proximal IT band    CCW ILU to slow stool transit time     Neuro: 10'  Supine segmental breathing with max inhalation x 3 mins     SL segmental breathing with max inhalation x 3 mins    Seated segmental breathing with max inhalation x 4 mins          Neuro: 12'  5lb box lifts with exhale/contract prior to lifting x 8    5lb box lifts and carries with PFM pre-activation x 8    Seated DB with PFM lengthening on inhale with diagram for visual cueing x 3 mins         There-ex: 25'  - Supine ITB Stretch with Strap  - 1 x daily - 7 x weekly - 2 reps - 1 min hold  - Quadruped Rock Back into [employer] Up  - 1 x daily - 7 x weekly - 10 reps  - Supine Figure 4 Piriformis Stretch  - 1 x daily - 7 x weekly - 10 reps  - Half Kneeling Hip Flexor Stretch with Sidebend  - 1 x daily - 7 x weekly - 2 sets - 1 min hold  - Half Kneeling Hip Flexor Stretch  - 1 x daily - 7 x weekly - 2 sets - 1 min hold There-ex: 14'  Supine 3 way posterior hip stretch with strap 2 x 1 min each way    Kneeling side stretch x 1 min B    Cat cow x 10    Cow side stretch x 10    IT band stretch in standing x 1 min  B                       HEP: DB, wand use for 15 mins  Access Code: Bennie Alvarado  Date: 12/08/2023  - Supine ITB Stretch with Strap  - 1 x daily - 7 x weekly - 2 reps - 1 min hold  - Quadruped Rock Back into [employer] Up  - 1 x daily - 7 x weekly - 10 reps  - Supine Figure 4 Piriformis Stretch  - 1 x daily - 7 x weekly - 10 reps  - Half Kneeling Hip Flexor Stretch with Sidebend  - 1 x daily - 7 x weekly - 2 sets - 1 min hold  - Half Kneeling Hip Flexor Stretch  - 1 x daily - 7 x weekly - 2 sets - 1 min hold    Charges: manual x 2, ther-ex x 1     Total Timed Treatment: 42 min  Total Treatment Time: 44 min

## 2023-12-27 ENCOUNTER — APPOINTMENT (OUTPATIENT)
Dept: PHYSICAL THERAPY | Facility: HOSPITAL | Age: 58
End: 2023-12-27
Attending: OBSTETRICS & GYNECOLOGY
Payer: COMMERCIAL

## 2023-12-27 ENCOUNTER — TELEPHONE (OUTPATIENT)
Dept: PHYSICAL THERAPY | Facility: HOSPITAL | Age: 58
End: 2023-12-27

## 2024-01-03 ENCOUNTER — OFFICE VISIT (OUTPATIENT)
Dept: PHYSICAL THERAPY | Facility: HOSPITAL | Age: 59
End: 2024-01-03
Attending: OBSTETRICS & GYNECOLOGY
Payer: COMMERCIAL

## 2024-01-03 PROCEDURE — 97140 MANUAL THERAPY 1/> REGIONS: CPT

## 2024-01-03 NOTE — PROGRESS NOTES
Diagnosis:   Dyspareunia in female (N94.10)          Referring Provider: Olman  Date of Evaluation:    11/1/23    Precautions:   cystocele, rectocele, and enterocele repair surgery on 2/7/23 Next MD visit:   none scheduled  Date of Surgery: n/a   Insurance Primary/Secondary: [employer] PPO / N/A     # Auth Visits: 10 per insurance  through 1/29/24    Subjective: Reports that she has been noticing pain in the deep, inferior part of her vagina bilaterally, especially with intercourse.      Pain: 5/10      Objective:     Flexibility Summary: WNL JOSESITO LE except significant R hip flexor tightness     Internal Examination      Pelvic Floor Muscle strength: (PERF= Power/Endurance/Reps/Fast) MMT: 2+/5  External Anal Sphincter: NT  Accessory Muscle Use: none     Tissue Laxity Test:  Anterior Wall: WNL  Posterior Wall: WNL  Apical: WNL     Eccentric lengthening contraction: impaired  Bearing down Valsalva maneuver (2-3x): NT     Internal Palpation: WNL except Pubococcygeus/Pubovaginalis R>L minimal restriction and tenderness  Iliococcygeus R>L moderate restriction and tenderness  Coccygeus R>L moderate restriction and tenderness  Obturator Internus R>L moderate restriction and tenderness    11/20: TrP in [place] muscle with moderate TTP      Assessment: Returned to vaginal soft tissue work today and noted increased tenderness and tension in R OI and bilateral coccygeus and iliococcygeus. Encouraged patient to resume dilator treatments, as tolerated. Also continued to perform abdominal and hip flexor soft tissue work and noted increased tension on R>L iliopsoas. Plan to continue to emphasize soft tissue work and exercise to address remaining tissue tension for return to all ADL.       Goals:   Patient will demonstrate optimal PFM elongation with coordinated breathing x 10 reps to alleviate PFM pain and muscle tension.  Patient will demonstrate normalized tone and minimal pain onset (</= 2/10) with internal myofascial release for  increased tolerance to gynecological exam.   Patient will demonstrate R hip flexor muscle length WNL to alleviate tension in support structures of pelvic floor musculature.   Patient will report improved volitional PFM relaxation ability throughout the day to decrease tension holding pattern for improved PFM health and function.  Patient will report adherence to HEP for continued exercise benefits following cessation of PT.    Plan: Continue with internal work and breath work. Follow up on dilator use. Continue with stretching.   Date: 11/8/2023  TX#: 2/10 Date: 11/15/23                TX#: 3/10 Date: 11/20/23                TX#: 4/10 Date:  11/29/23               TX#: 5/10 Date: 12/6/23  Tx#: 6/10 Date: 12/20/23  TX#: 7/10 Date: 1/3/23  Tx# 8/10   Manual: 40'  Internal myofascial release to all three layers bilaterally    Hip flexor release on R    Brief R OI assessment Manual: 44'  Internal myofascial release to bilateral deep ms layers     Abdominal/Lateral hip STM/myofascial release  Manual: 42'  Internal myofascial release to middle and deep ms layers B    RLQ of abdomen, as well as R anterior/lateral hip, quad, gastroc, tib anterior STM/myofascial work  Manual: 30'  Internal myofascial release to middle and deep ms layers B Manual: 26'  STM/myofascial release to lower abdomen, hip flexors, R quad, R proximal IT band Manual: 28'  STM/myofascial release to lower abdomen, hip flexors, R quad, R proximal IT band    CCW ILU to slow stool transit time  Manual: 41'  STM/myofascial release to lower abdomen, hip flexors    Internal myofascial release to R OI and bilateral coccygeus, iliococcygeus     Neuro: 10'  Supine segmental breathing with max inhalation x 3 mins     SL segmental breathing with max inhalation x 3 mins    Seated segmental breathing with max inhalation x 4 mins          Neuro: 12'  5lb box lifts with exhale/contract prior to lifting x 8    5lb box lifts and carries with PFM pre-activation x  8    Seated DB with PFM lengthening on inhale with diagram for visual cueing x 3 mins         There-ex: 18'  - Supine ITB Stretch with Strap  - 1 x daily - 7 x weekly - 2 reps - 1 min hold  - Quadruped Rock Back into Prone Press Up  - 1 x daily - 7 x weekly - 10 reps  - Supine Figure 4 Piriformis Stretch  - 1 x daily - 7 x weekly - 10 reps  - Half Kneeling Hip Flexor Stretch with Sidebend  - 1 x daily - 7 x weekly - 2 sets - 1 min hold  - Half Kneeling Hip Flexor Stretch  - 1 x daily - 7 x weekly - 2 sets - 1 min hold There-ex: 14'  Supine 3 way posterior hip stretch with strap 2 x 1 min each way    Kneeling side stretch x 1 min B    Cat cow x 10    Cow side stretch x 10    IT band stretch in standing x 1 min  B                          HEP: DB, wand use for 15 mins  Access Code: [email or web address]  Date: 12/08/2023  - Supine ITB Stretch with Strap  - 1 x daily - 7 x weekly - 2 reps - 1 min hold  - Quadruped Rock Back into Prone Press Up  - 1 x daily - 7 x weekly - 10 reps  - Supine Figure 4 Piriformis Stretch  - 1 x daily - 7 x weekly - 10 reps  - Half Kneeling Hip Flexor Stretch with Sidebend  - 1 x daily - 7 x weekly - 2 sets - 1 min hold  - Half Kneeling Hip Flexor Stretch  - 1 x daily - 7 x weekly - 2 sets - 1 min hold    Charges: manual x 3    Total Timed Treatment: 41 min  Total Treatment Time: 43 min

## 2024-01-05 ENCOUNTER — HOSPITAL ENCOUNTER (OUTPATIENT)
Age: 59
Discharge: HOME | End: 2024-01-05
Payer: COMMERCIAL

## 2024-01-05 VITALS
SYSTOLIC BLOOD PRESSURE: 114 MMHG | OXYGEN SATURATION: 99 % | TEMPERATURE: 99 F | HEIGHT: 61 IN | WEIGHT: 116 LBS | RESPIRATION RATE: 18 BRPM | HEART RATE: 66 BPM | DIASTOLIC BLOOD PRESSURE: 68 MMHG | BODY MASS INDEX: 21.9 KG/M2

## 2024-01-05 DIAGNOSIS — E86.0 DEHYDRATION: Primary | ICD-10-CM

## 2024-01-05 DIAGNOSIS — K52.9 COLITIS: ICD-10-CM

## 2024-01-05 LAB
#MXD IC: 0.6 X10ˆ3/UL (ref 0.1–1)
BUN BLD-MCNC: 25 MG/DL (ref 7–18)
CHLORIDE BLD-SCNC: 105 MMOL/L (ref 98–112)
CO2 BLD-SCNC: 24 MMOL/L (ref 21–32)
CREAT BLD-MCNC: 0.8 MG/DL
EGFRCR SERPLBLD CKD-EPI 2021: 86 ML/MIN/1.73M2 (ref 60–?)
GLUCOSE BLD-MCNC: 94 MG/DL (ref 70–99)
HCT VFR BLD AUTO: 39.5 %
HCT VFR BLD CALC: 36 %
HGB BLD-MCNC: 12.8 G/DL
ISTAT IONIZED CALCIUM FOR CHEM 8: 1.25 MMOL/L (ref 1.12–1.32)
LYMPHOCYTES # BLD AUTO: 1.9 X10ˆ3/UL (ref 1–4)
LYMPHOCYTES NFR BLD AUTO: 37.6 %
MCH RBC QN AUTO: 30.2 PG (ref 26–34)
MCHC RBC AUTO-ENTMCNC: 32.4 G/DL (ref 31–37)
MCV RBC AUTO: 93.2 FL (ref 80–100)
MIXED CELL %: 11.1 %
NEUTROPHILS # BLD AUTO: 2.6 X10ˆ3/UL (ref 1.5–7.7)
NEUTROPHILS NFR BLD AUTO: 51.3 %
PLATELET # BLD AUTO: 252 X10ˆ3/UL (ref 150–450)
POTASSIUM BLD-SCNC: 4.4 MMOL/L (ref 3.6–5.1)
RBC # BLD AUTO: 4.24 X10ˆ6/UL
SODIUM BLD-SCNC: 137 MMOL/L (ref 136–145)
WBC # BLD AUTO: 5.1 X10ˆ3/UL (ref 4–11)

## 2024-01-05 PROCEDURE — 85025 COMPLETE CBC W/AUTO DIFF WBC: CPT | Performed by: PHYSICIAN ASSISTANT

## 2024-01-05 PROCEDURE — 80047 BASIC METABLC PNL IONIZED CA: CPT

## 2024-01-05 PROCEDURE — 99215 OFFICE O/P EST HI 40 MIN: CPT

## 2024-01-05 PROCEDURE — 96360 HYDRATION IV INFUSION INIT: CPT

## 2024-01-05 PROCEDURE — 99214 OFFICE O/P EST MOD 30 MIN: CPT

## 2024-01-05 RX ORDER — SODIUM CHLORIDE 9 MG/ML
1000 INJECTION, SOLUTION INTRAVENOUS ONCE
Status: COMPLETED | OUTPATIENT
Start: 2024-01-05 | End: 2024-01-05

## 2024-01-06 NOTE — ED PROVIDER NOTES
Patient Seen in: [facility]      History     Chief Complaint   Patient presents with    Headache     Stated Complaint: Headache    Subjective:   HPI    Courtney is a 58-year-old female presents today for evaluation of headache and feeling dehydrated.  She states that she has had colitis and that she started to have a flareup around Thanksgiving after having to be on antibiotics.  Since then, she has had a lot of loose bowel movements and has been experiencing headache and general malaise.  She is feeling dizzy.  She denies fever, chills, cough and bodyaches.    Objective:   Past Medical History:   Diagnosis Date    Allergic rhinitis     Anxiety 2020    ASCUS of cervix with negative high risk HPV 04/12/2017    [facility] system.     Carpal tunnel syndrome 06/20/2013    Log Date: 05/01/2013     Colitis     Dense breasts     Disorder of thyroid     Enlarged uterus     Some imaging says \"possible fibroids\" but nothing distinct - probable adenomyosis.     Enthesopathy of wrist and carpus 06/20/2013    Log Date: 01/30/2013     Environmental allergies     Essential hypertension     GERD (gastroesophageal reflux disease) 2016    H/O screening mammography 02/03/2022    Negative    High blood pressure     Hx of motion sickness     Hypothyroid 04/16/2012    Insomnia 2020    Late effect of fracture of upper extremities 06/20/2013    Log Date: 05/03/2013     Lymphocytic colitis 07/2016    Lymphocytic colitis    Nontoxic multinodular goiter 06/25/2013    Ovarian cyst     Pap smear for cervical cancer screening 07/28/2020    Pap & HPV negative.     PONV (postoperative nausea and vomiting)     uses scop patche 1 day before & at least 1 week after. BP drop with epidural    PTSD (post-traumatic stress disorder) 2020    Screening mammogram, encounter for 02/03/2022    Benign. Heterogeneously dense. consider supplemental screening    Status post laparoscopic hysterectomy 08/14/2020    Robotic-assisted total laparoscopic  hysterectomy, cystoscopy - Path: Adenomyosis. Dr. Brenda Donnelly     Visual impairment     glasses    Vitamin D deficiency 2016    Well woman exam with routine gynecological exam 01/13/2022              Past Surgical History:   Procedure Laterality Date    ANTERIOR REPAIR  02/07/2023    COLONOSCOPY N/A 10/08/2014    Normal. COLONOSCOPY;  Surgeon: Mani York MD;  Location:  ENDOSCOPY     COLONOSCOPY      COLPOSCOPY,BX CERVIX/ENDOCERV CURR  2017    Biopsy in the office per patient.     CYTOLOGY FINE NEEDLE  06/27/2013    FNA LEFT thyroid nodule - Dr. Lucie Bernstein DO    EGD  10/08/2014    with biopsy. Esophageal furrowing, possible blunting of villi duodenum    ENDOMETRIAL BIOPSY - JAR(S): 2  02/07/2018    Benign. Sounded 10 cm - Dr. Reba Sutton    HERNIA SURGERY      HYSTERECTOMY  08/14/2020    Robotic-assisted total laparoscopic hysterectomy, cystoscopy - Path: Adenomyosis. Dr. Brenda Donnelly    HYSTEROSCOPY,ABLATION ENDOMETRIUM  03/14/2018    Hysteroscopy, dilation and curettage, hydrothermal ablation, laparoscopic bilateral salpingectomy    HYSTEROSCOPY,WITH SAMPLING  06/12/2017    Hysteroscopy, D&C - for menometrorrhagia & thickened endometrium. Apparently - benign endocervical & endometrial polyp.     INGUINAL HERNIA REPAIR Right     Laparoscopic, + internal staples    KNEE SURGERY      MIDURETHRAL SLING  02/07/2023    with cystoscopy    NEEDLE BIOPSY LEFT  2020    OTHER SURGICAL HISTORY Right     Right wrist surgery x3    OTHER SURGICAL HISTORY N/A 10/08/2014    Procedure: ESOPHAGOGASTRODUODENOSCOPY (EGD);  Surgeon: Mani York MD;  Location:  ENDOSCOPY    POSTERIOR REPAIR  02/07/2023    SALPINGECTOMY Bilateral 03/14/2018    Laparoscopic bilateral salpingectomy done at time of HSC, D&C, HTA ablation     UMBILICAL HERNIA REPAIR      UPPER GI ENDOSCOPY,EXAM      US BREAST BIOPSY Left 08/12/2020    US-guided LEFT breast biopsy - BENIGN - fibrocystic changes including microcyst formation, dense  fibrosis and focal secretory change. Complicated by large hematoma (approximately 6 cm or so)     UTEROSACRAL LIGAMENT SUSPENSION  02/07/2023                Social History     Socioeconomic History    Marital status:    Tobacco Use    Smoking status: Never    Smokeless tobacco: Never   Vaping Use    Vaping Use: Never used   Substance and Sexual Activity    Alcohol use: Yes     Comment: SOCIALLY    Drug use: No    Sexual activity: Yes     Partners: Male     Birth control/protection: Surgical, Hysterectomy     Comment: Bilateral salpingectomy 3/2018              Review of Systems    Positive for stated complaint: Headache  Other systems are as noted in HPI.  Constitutional and vital signs reviewed.      All other systems reviewed and negative except as noted above.    Physical Exam     ED Triage Vitals [01/05/24 1718]   /68   Pulse 66   Resp 18   Temp 98.6 °F (37 °C)   Temp src Temporal   SpO2 99 %   O2 Device None (Room air)       Current:/68   Pulse 66   Temp 98.6 °F (37 °C) (Temporal)   Resp 18   Ht 154.9 cm (5' 1\")   Wt 52.6 kg   LMP 08/01/2019 (Exact Date)   SpO2 99%   BMI 21.92 kg/m²         Physical Exam    Nursing note reviewed. Vital signs reviewed.  Constitutional: Oriented to person, place, and time. Patient appears well-developed and well-nourished, non-toxic and in no acute distress.  Head: Normocephalic and atraumatic.   Cardiovascular: Normal rate, regular rhythm, normal heart sounds and intact distal pulses.    Pulmonary/Chest: Effort normal and breath sounds normal.   Abdominal: Soft. Bowel sounds are normal.  Nontender to palpation.  Musculoskeletal: Normal range of motion. No edema or tenderness.   Neurological: CN III - XII grossly intact, normal strength and sensation.   Skin: Skin is warm and dry. No rash noted. No erythema.       ED Course     Labs Reviewed   POCT ISTAT CHEM8 CARTRIDGE - Abnormal; Notable for the following components:       Result Value    ISTAT BUN 25  (*)     All other components within normal limits   POCT CBC          Labs are drawn and IV line established.  Patient is given 1 L of normal saline.  I offer analgesia and antinausea, but politely declined.  Patient feels better after treatment.  Results are reviewed.       MDM             Medical Decision Making  Patient is a 58-year-old female presents today for evaluation of headache and general malaise.  She states that she has been dealing with recurrence of her colitis for just over a month.  Differential diagnosis includes, but is not limited to electrolyte imbalance, dehydration, and other potentially life-threatening processes.  Labs are reviewed.  Patient feels better after 1 L of normal saline.    Amount and/or Complexity of Data Reviewed  Labs: ordered. Decision-making details documented in ED Course.    Risk  OTC drugs.        Disposition and Plan     Clinical Impression:  1. Dehydration    2. Colitis         Disposition:  Discharge  1/5/2024  7:44 pm    Follow-up:  Karina Vinson  [address] [facility] [address]  [phone]                Medications Prescribed:  Discharge Medication List as of 1/5/2024  7:51 PM

## 2024-01-06 NOTE — DISCHARGE INSTRUCTIONS
Please return to the Emergency department/clinic if symptoms worsen or you develop new symptoms.  Follow up with your primary care physician in 2 days. Take any medications prescribed to you as instructed.

## 2024-01-12 ENCOUNTER — OFFICE VISIT (OUTPATIENT)
Dept: PHYSICAL THERAPY | Facility: HOSPITAL | Age: 59
End: 2024-01-12
Attending: OBSTETRICS & GYNECOLOGY
Payer: COMMERCIAL

## 2024-01-12 PROCEDURE — 97140 MANUAL THERAPY 1/> REGIONS: CPT

## 2024-01-12 PROCEDURE — 97112 NEUROMUSCULAR REEDUCATION: CPT

## 2024-01-12 NOTE — PROGRESS NOTES
Diagnosis:   Dyspareunia in female (N94.10)          Referring Provider: Olman  Date of Evaluation:    11/1/23    Precautions:   cystocele, rectocele, and enterocele repair surgery on 2/7/23 Next MD visit:   none scheduled  Date of Surgery: n/a   Insurance Primary/Secondary: [employer] [place] P[place] / N/A     # Auth Visits: 10 per insurance  through 1/29/24    Subjective: Reports that she continues to have pain in the inner, deep R side of her PFM.     Pain: 2-4/10      Objective:     Flexibility Summary: WNL JOSESITO LE except significant R hip flexor tightness     Internal Examination      Pelvic Floor Muscle strength: (PERF= Power/Endurance/Reps/Fast) MMT: 2+/5  External Anal Sphincter: NT  Accessory Muscle Use: none     Tissue Laxity Test:  Anterior Wall: WNL  Posterior Wall: WNL  Apical: WNL     Eccentric lengthening contraction: impaired  Bearing down Valsalva maneuver (2-3x): NT     Internal Palpation: WNL except Pubococcygeus/Pubovaginalis R>L minimal restriction and tenderness  Iliococcygeus R>L moderate restriction and tenderness  Coccygeus R>L moderate restriction and tenderness  Obturator Internus R>L moderate restriction and tenderness    11/20: TrP in [place] muscle with moderate TTP      Assessment: Courtney continues to experience mild to moderate pain in several pelvic floor muscles bilaterally. Symptoms only dissipate slightly with sustained pressure application. Encouraged her to continue with her yoga and resume internal wand treatments to address remaining sore tissue. Plan for reassessment next session.       Goals:   Patient will demonstrate optimal PFM elongation with coordinated breathing x 10 reps to alleviate PFM pain and muscle tension.  Patient will demonstrate normalized tone and minimal pain onset (</= 2/10) with internal myofascial release for increased tolerance to gynecological exam.   Patient will demonstrate R hip flexor muscle length WNL to alleviate tension in support structures of pelvic floor  musculature.   Patient will report improved volitional PFM relaxation ability throughout the day to decrease tension holding pattern for improved PFM health and function.  Patient will report adherence to HEP for continued exercise benefits following cessation of PT.    Plan: Continue with internal work and breath work. Continue with stretching. Reassessment.  Date: 11/20/23                TX#: 4/10 Date:  11/29/23               TX#: 5/10 Date: 12/6/23  Tx#: 6/10 Date: 12/20/23  TX#: 7/10 Date: 1/3/23  Tx# 8/10 Date: 1/12/24  Tx#: 9/10   Manual: 42'  Internal myofascial release to middle and deep ms layers B    RLQ of abdomen, as well as R anterior/lateral hip, quad, gastroc, tib anterior STM/myofascial work  Manual: 30'  Internal myofascial release to middle and deep ms layers B Manual: 26'  STM/myofascial release to lower abdomen, hip flexors, R quad, R proximal IT band Manual: 28'  STM/myofascial release to lower abdomen, hip flexors, R quad, R proximal IT band    CCW ILU to slow stool transit time  Manual: 41'  STM/myofascial release to lower abdomen, hip flexors    Internal myofascial release to R OI and bilateral coccygeus, iliococcygeus  Manual: 26'  Internal myofascial release to R OI and bilateral coccygeus, iliococcygeus, as well as R bulbocavernosus    Neuro: 12'  5lb box lifts with exhale/contract prior to lifting x 8    5lb box lifts and carries with PFM pre-activation x 8    Seated DB with PFM lengthening on inhale with diagram for visual cueing x 3 mins         There-ex: 18'  - Supine ITB Stretch with Strap  - 1 x daily - 7 x weekly - 2 reps - 1 min hold  - Quadruped Rock Back into Prone Press Up  - 1 x daily - 7 x weekly - 10 reps  - Supine Figure 4 Piriformis Stretch  - 1 x daily - 7 x weekly - 10 reps  - Half Kneeling Hip Flexor Stretch with Sidebend  - 1 x daily - 7 x weekly - 2 sets - 1 min hold  - Half Kneeling Hip Flexor Stretch  - 1 x daily - 7 x weekly - 2 sets - 1 min hold There-ex:  14'  Supine 3 way posterior hip stretch with strap 2 x 1 min each way    Kneeling side stretch x 1 min B    Cat cow x 10    Cow side stretch x 10    IT band stretch in standing x 1 min  B      Neuro: 12'  Discussed discharge planning/POC    Seated flexbar external PFM myofascial release x 3 min B (6 min total)                   HEP: DB, wand use for 15 mins  Access Code: [email or web address]  Date: 12/08/2023  - Supine ITB Stretch with Strap  - 1 x daily - 7 x weekly - 2 reps - 1 min hold  - Quadruped Rock Back into Prone Press Up  - 1 x daily - 7 x weekly - 10 reps  - Supine Figure 4 Piriformis Stretch  - 1 x daily - 7 x weekly - 10 reps  - Half Kneeling Hip Flexor Stretch with Sidebend  - 1 x daily - 7 x weekly - 2 sets - 1 min hold  - Half Kneeling Hip Flexor Stretch  - 1 x daily - 7 x weekly - 2 sets - 1 min hold    Charges: manual x 2, neuro x1    Total Timed Treatment: 38 min  Total Treatment Time: 42 min

## 2024-01-24 ENCOUNTER — OFFICE VISIT (OUTPATIENT)
Dept: PHYSICAL THERAPY | Facility: HOSPITAL | Age: 59
End: 2024-01-24
Attending: OBSTETRICS & GYNECOLOGY
Payer: COMMERCIAL

## 2024-01-24 PROCEDURE — 97140 MANUAL THERAPY 1/> REGIONS: CPT

## 2024-01-24 PROCEDURE — 97112 NEUROMUSCULAR REEDUCATION: CPT

## 2024-01-25 NOTE — PROGRESS NOTES
Progress Summary  Pt has attended 10 visits in Physical Therapy.     Diagnosis:   Dyspareunia in female (N94.10)          Referring Provider: Olman  Date of Evaluation:    11/1/23    Precautions:   cystocele, rectocele, and enterocele repair surgery on 2/7/23 Next MD visit:   none scheduled  Date of Surgery: n/a   Insurance Primary/Secondary: [employer] / N/A     # Auth Visits: 10 per insurance  through 1/29/24    Subjective: Reports that she continues to have pain with intercourse, as well as notable R hip tightness. Reports 50% improvement since starting PT.    Pain: 5/10 discomfort      Objective:     Flexibility Summary: WNL JOSESITO LE except moderate residual R hip flexor and notable R external hip rotator tightness     Internal Examination      Pelvic Floor Muscle strength: (PERF= Power/Endurance/Reps/Fast) MMT: 2+/5  External Anal Sphincter: NT  Accessory Muscle Use: none     Eccentric lengthening contraction: impaired but improved from start of care  Bearing down Valsalva maneuver (2-3x): WNL    Special tests: (+) R PENNIE     Internal Palpation as of 1/24/24: WNL except Pubococcygeus/Pubovaginalis R mod restriction and tenderness   Iliococcygeus R moderate restriction and tenderness  Coccygeus R moderate restriction and tenderness  Obturator Internus R moderate restriction and tenderness  Compressor urethrae L mod restriction and tenderness        Assessment: Courtney has made moderate progress over this plan of care. She demonstrates some improvement in hip mobility, as well as PFM eccentric lengthening with diaphragmatic breathing. She endorses adherence to regular stretching exercise and wand use intra-vaginally to alleviate remaining pain and tightness. Despite progress, she does continue to have notable tension and pain in R>L PFM, as well as R hip. Aforementioned deficits continue to compromise QoL and ability to engage in intercourse or sit or lie in certain positions comfortably for long periods. As  such, she would continue to benefit from skilled outpatient pelvic health PT 1-2x/week for up to 6 more visits to promote return to PLOF.      Goals:   Patient will demonstrate optimal PFM elongation with coordinated breathing x 10 reps to alleviate PFM pain and muscle tension. PROGRESSING.  Patient will demonstrate normalized tone and minimal pain onset (</= 2/10) with internal myofascial release for increased tolerance to gynecological exam. NOT MET.  Patient will demonstrate R hip flexor muscle length WNL to alleviate tension in support structures of pelvic floor musculature. PROGRESSING.  Patient will report improved volitional PFM relaxation ability throughout the day to decrease tension holding pattern for improved PFM health and function. PROGRESSING.  Patient will report adherence to HEP for continued exercise benefits following cessation of PT. PROGRESSING.    Plan: Continue skilled Physical Therapy 1-2 x/week or a total of 10 visits over a 90 day period. Treatment will include: ther-ex, neuro re-ed, manual, progressive education       Patient/Family/Caregiver was advised of these findings, precautions, and treatment options and has agreed to actively participate in planning and for this course of care.    Thank you for your referral. If you have any questions, please contact me at Dept: [phone].    Sincerely,  Electronically signed by therapist: Chanel Tapia PT     Physician's certification required:  Yes  Please co-sign or sign and return this letter via fax as soon as possible to [phone].   I certify the need for these services furnished under this plan of treatment and while under my care.    X___________________________________________________ Date____________________    Certification From: 1/25/2024  To:4/24/2024     Date: 11/20/23                TX#: 4/10 Date:  11/29/23               TX#: 5/10 Date: 12/6/23  Tx#: 6/10 Date: 12/20/23  TX#: 7/10 Date: 1/3/23  Tx# 8/10 Date: 1/12/24  Tx#:  9/10 Date: 1/24/24  Tx#: 10/10   Manual: 42'  Internal myofascial release to middle and deep ms layers B    RLQ of abdomen, as well as R anterior/lateral hip, quad, gastroc, tib anterior STM/myofascial work  Manual: 30'  Internal myofascial release to middle and deep ms layers B Manual: 26'  STM/myofascial release to lower abdomen, hip flexors, R quad, R proximal IT band Manual: 28'  STM/myofascial release to lower abdomen, hip flexors, R quad, R proximal IT band    CCW ILU to slow stool transit time  Manual: 41'  STM/myofascial release to lower abdomen, hip flexors    Internal myofascial release to R OI and bilateral coccygeus, iliococcygeus  Manual: 26'  Internal myofascial release to R OI and bilateral coccygeus, iliococcygeus, as well as R bulbocavernosus Manual: 28'  Reassessment followed by internal myofascial release to R OI and bilateral coccygeus, iliococcygeus, as well as R bulbocavernosus    Neuro: 12'  5lb box lifts with exhale/contract prior to lifting x 8    5lb box lifts and carries with PFM pre-activation x 8    Seated DB with PFM lengthening on inhale with diagram for visual cueing x 3 mins         There-ex: 18'  - Supine ITB Stretch with Strap  - 1 x daily - 7 x weekly - 2 reps - 1 min hold  - Quadruped Rock Back into Prone Press Up  - 1 x daily - 7 x weekly - 10 reps  - Supine Figure 4 Piriformis Stretch  - 1 x daily - 7 x weekly - 10 reps  - Half Kneeling Hip Flexor Stretch with Sidebend  - 1 x daily - 7 x weekly - 2 sets - 1 min hold  - Half Kneeling Hip Flexor Stretch  - 1 x daily - 7 x weekly - 2 sets - 1 min hold There-ex: 14'  Supine 3 way posterior hip stretch with strap 2 x 1 min each way    Kneeling side stretch x 1 min B    Cat cow x 10    Cow side stretch x 10    IT band stretch in standing x 1 min  B      Neuro: 12'  Discussed discharge planning/POC    Seated flexbar external PFM myofascial release x 3 min B (6 min total) Neuro: 12'  Reassessment testing    POC/Discharge discussion                          HEP: abilio MERLOS use for 15 mins  Access Code: [email or web address]  Date: 12/08/2023  - Supine ITB Stretch with Strap  - 1 x daily - 7 x weekly - 2 reps - 1 min hold  - Quadruped Rock Back into Prone Press Up  - 1 x daily - 7 x weekly - 10 reps  - Supine Figure 4 Piriformis Stretch  - 1 x daily - 7 x weekly - 10 reps  - Half Kneeling Hip Flexor Stretch with Sidebend  - 1 x daily - 7 x weekly - 2 sets - 1 min hold  - Half Kneeling Hip Flexor Stretch  - 1 x daily - 7 x weekly - 2 sets - 1 min hold    Charges: manual x 2, neuro x1    Total Timed Treatment: 40 min  Total Treatment Time: 42 min

## 2024-01-26 ENCOUNTER — TELEPHONE (OUTPATIENT)
Dept: PHYSICAL THERAPY | Facility: HOSPITAL | Age: 59
End: 2024-01-26

## 2024-01-30 ENCOUNTER — ORDER TRANSCRIPTION (OUTPATIENT)
Dept: ADMINISTRATIVE | Facility: HOSPITAL | Age: 59
End: 2024-01-30

## 2024-01-30 DIAGNOSIS — Z13.6 SCREENING FOR CARDIOVASCULAR CONDITION: Primary | ICD-10-CM

## 2024-02-08 ENCOUNTER — HOSPITAL ENCOUNTER (OUTPATIENT)
Dept: CT IMAGING | Age: 59
Discharge: HOME | End: 2024-02-08
Attending: INTERNAL MEDICINE

## 2024-02-08 DIAGNOSIS — Z13.6 SCREENING FOR CARDIOVASCULAR CONDITION: ICD-10-CM

## 2024-02-08 DIAGNOSIS — Z13.9 ENCOUNTER FOR SCREENING: ICD-10-CM

## 2024-02-12 ENCOUNTER — OFFICE VISIT (OUTPATIENT)
Dept: PHYSICAL THERAPY | Facility: HOSPITAL | Age: 59
End: 2024-02-12
Attending: OBSTETRICS & GYNECOLOGY
Payer: COMMERCIAL

## 2024-02-12 PROCEDURE — 97140 MANUAL THERAPY 1/> REGIONS: CPT

## 2024-02-12 PROCEDURE — 97112 NEUROMUSCULAR REEDUCATION: CPT

## 2024-02-12 NOTE — PROGRESS NOTES
Diagnosis:   Dyspareunia in female (N94.10)          Referring Provider: Olman  Date of Evaluation:    11/1/23    Precautions:   cystocele, rectocele, and enterocele repair surgery on 2/7/23 Next MD visit:   none scheduled  Date of Surgery: n/a   Insurance Primary/Secondary: [employer] [place] / N/A     # Auth Visits: 16 per insurance  through 3/25/24    Subjective: Reports that she has been going to [employer], and she's been feeling good overall.     Pain: 2/10 discomfort      Objective:     Flexibility Summary: WNL JOSESITO LE except moderate residual R hip flexor and notable R external hip rotator tightness     Internal Examination      Pelvic Floor Muscle strength: (PERF= Power/Endurance/Reps/Fast) MMT: 2+/5  External Anal Sphincter: NT  Accessory Muscle Use: none     Eccentric lengthening contraction: impaired but improved from start of care  Bearing down Valsalva maneuver (2-3x): WNL    Special tests: (+) R PENNIE     Internal Palpation as of 1/24/24: WNL except Pubococcygeus/Pubovaginalis R mod restriction and tenderness   Iliococcygeus R moderate restriction and tenderness  Coccygeus R moderate restriction and tenderness  Obturator Internus R moderate restriction and tenderness  Compressor urethrae L mod restriction and tenderness        Assessment: Discussed common causes of pelvic obliquity including asymmetrical muscle tensioning on one side of the pelvis. Reassured patient that pelvic obliquity is not caused by bony asymmetry the vast majority of the time. Internal work today revealed notably less tension and subjective pain compared to the last few sessions. Patient is currently doing yoga/exercising regularly, which seems to be positively impacting her hip and pelvic floor symptoms. Encouraged her to continue with exercise and other optimal health behaviors to mitigate remaining symptoms.       Goals:   Patient will demonstrate optimal PFM elongation with coordinated breathing x 10 reps to alleviate PFM pain  and muscle tension. PROGRESSING.  Patient will demonstrate normalized tone and minimal pain onset (</= 2/10) with internal myofascial release for increased tolerance to gynecological exam. NOT MET.  Patient will demonstrate R hip flexor muscle length WNL to alleviate tension in support structures of pelvic floor musculature. PROGRESSING.  Patient will report improved volitional PFM relaxation ability throughout the day to decrease tension holding pattern for improved PFM health and function. PROGRESSING.  Patient will report adherence to HEP for continued exercise benefits following cessation of PT. PROGRESSING.    Plan: Continue with internal work and RLQ abdominal work.   Date:  11/29/23               TX#: 5/10 Date: 12/6/23  Tx#: 6/10 Date: 12/20/23  TX#: 7/10 Date: 1/3/23  Tx# 8/10 Date: 1/12/24  Tx#: 9/10 Date: 1/24/24  Tx#: 10/10 Date: 2/12/24  Tx#: 11/16   Manual: 30'  Internal myofascial release to middle and deep ms layers B Manual: 26'  STM/myofascial release to lower abdomen, hip flexors, R quad, R proximal IT band Manual: 28'  STM/myofascial release to lower abdomen, hip flexors, R quad, R proximal IT band    CCW ILU to slow stool transit time  Manual: 41'  STM/myofascial release to lower abdomen, hip flexors    Internal myofascial release to R OI and bilateral coccygeus, iliococcygeus  Manual: 26'  Internal myofascial release to R OI and bilateral coccygeus, iliococcygeus, as well as R bulbocavernosus Manual: 28'  Reassessment followed by internal myofascial release to R OI and bilateral coccygeus, iliococcygeus, as well as R bulbocavernosus Manual: 32'  R sided internal myofascial release    RLQ abdominal myofascial release, as well as diaphragm STM   Neuro: 12'  5lb box lifts with exhale/contract prior to lifting x 8    5lb box lifts and carries with PFM pre-activation x 8    Seated DB with PFM lengthening on inhale with diagram for visual cueing x 3 mins         There-ex: 18'  - Supine ITB Stretch  with Strap  - 1 x daily - 7 x weekly - 2 reps - 1 min hold  - Quadruped Rock Back into Prone Press Up  - 1 x daily - 7 x weekly - 10 reps  - Supine Figure 4 Piriformis Stretch  - 1 x daily - 7 x weekly - 10 reps  - Half Kneeling Hip Flexor Stretch with Sidebend  - 1 x daily - 7 x weekly - 2 sets - 1 min hold  - Half Kneeling Hip Flexor Stretch  - 1 x daily - 7 x weekly - 2 sets - 1 min hold There-ex: 14'  Supine 3 way posterior hip stretch with strap 2 x 1 min each way    Kneeling side stretch x 1 min B    Cat cow x 10    Cow side stretch x 10    IT band stretch in standing x 1 min  B      Neuro: 12'  Discussed discharge planning/POC    Seated flexbar external PFM myofascial release x 3 min B (6 min total) Neuro: 12'  Reassessment testing    POC/Discharge discussion     Neuro: 12'  Discussed home exercises and current home regimen    Discussed pain science and likely cause of pelvic obliquity                      HEP: DB, wand use for 15 mins  Access Code: [email or web address]  Date: 12/08/2023  - Supine ITB Stretch with Strap  - 1 x daily - 7 x weekly - 2 reps - 1 min hold  - Quadruped Rock Back into Prone Press Up  - 1 x daily - 7 x weekly - 10 reps  - Supine Figure 4 Piriformis Stretch  - 1 x daily - 7 x weekly - 10 reps  - Half Kneeling Hip Flexor Stretch with Sidebend  - 1 x daily - 7 x weekly - 2 sets - 1 min hold  - Half Kneeling Hip Flexor Stretch  - 1 x daily - 7 x weekly - 2 sets - 1 min hold    Charges: manual x 2, neuro x 1    Total Timed Treatment: 44 min  Total Treatment Time: 44 min

## (undated) DEVICE — [HIGH FLOW INSUFFLATOR,  DO NOT USE IF PACKAGE IS DAMAGED,  KEEP DRY,  KEEP AWAY FROM SUNLIGHT,  PROTECT FROM HEAT AND RADIOACTIVE SOURCES.]: Brand: PNEUMOSURE

## (undated) DEVICE — VCARE MEDIUM, UTERINE MANIPULATOR, VAGINAL-CERVICAL-AHLUWALIA'S-RETRACTOR-ELEVATOR: Brand: VCARE

## (undated) DEVICE — STERILE POLYISOPRENE POWDER-FREE SURGICAL GLOVES: Brand: PROTEXIS

## (undated) DEVICE — PREMIUM WET SKIN PREP TRAY: Brand: MEDLINE INDUSTRIES, INC.

## (undated) DEVICE — BLADELESS OBTURATOR: Brand: WECK VISTA

## (undated) DEVICE — VISUALIZATION SYSTEM: Brand: CLEARIFY

## (undated) DEVICE — MEGADYNE ELECTRODE ADULT PT RT

## (undated) DEVICE — MEGA SUTURECUT ND: Brand: ENDOWRIST

## (undated) DEVICE — SOL H2O IRRIGATION 3000ML

## (undated) DEVICE — PERMANENT CAUTERY HOOK: Brand: ENDOWRIST

## (undated) DEVICE — STANDARD HYPODERMIC NEEDLE,POLYPROPYLENE HUB: Brand: MONOJECT

## (undated) DEVICE — RETRACTOR LONE STAR STAYS LG

## (undated) DEVICE — CATH BARDEX IC FOLEY 16FR 5CC

## (undated) DEVICE — DERMABOND LIQUID ADHESIVE

## (undated) DEVICE — #15 STERILE STAINLESS BLADE: Brand: STERILE STAINLESS BLADES

## (undated) DEVICE — ARM DRAPE

## (undated) DEVICE — Device

## (undated) DEVICE — TUBING CYSTO

## (undated) DEVICE — 40580 - THE PINK PAD - ADVANCED TRENDELENBURG POSITIONING KIT: Brand: 40580 - THE PINK PAD - ADVANCED TRENDELENBURG POSITIONING KIT

## (undated) DEVICE — SUT VICRYL 0 CT-1 JJ31G

## (undated) DEVICE — SUTURE MONOCRYL 4-0 PS-2

## (undated) DEVICE — MEDI-VAC NON-CONDUCTIVE SUCTION TUBING: Brand: CARDINAL HEALTH

## (undated) DEVICE — SOL H2O IV

## (undated) DEVICE — COLUMN DRAPE

## (undated) DEVICE — GYN CDS: Brand: MEDLINE INDUSTRIES, INC.

## (undated) DEVICE — SLEEVE KENDALL SCD EXPRESS MED

## (undated) DEVICE — SOL NACL IRRIG 0.9% 1000ML BTL

## (undated) DEVICE — VESSEL SEALER EXTEND: Brand: ENDOWRIST

## (undated) DEVICE — KENDALL SCD EXPRESS SLEEVES, KNEE LENGTH, MEDIUM: Brand: KENDALL SCD

## (undated) DEVICE — SUTURE VLOC 90 2-0 9\" 2145

## (undated) DEVICE — BAG DRAIN INFECTION CNTRL 2000

## (undated) DEVICE — CANNULA SEAL

## (undated) DEVICE — SUT VICRYL 2-0 CT-1 J945H

## (undated) DEVICE — LIGHT HANDLE

## (undated) DEVICE — USE ITEM #176901

## (undated) DEVICE — COVER,MAYO STAND,STERILE: Brand: MEDLINE

## (undated) DEVICE — GYN LAP/ROBOTIC: Brand: MEDLINE INDUSTRIES, INC.

## (undated) NOTE — LETTER
[facility]  Phone: [phone]  OUTSIDE TESTING RESULT REQUEST      TO:   Dr. Juan Sierra  Today's Date: 8/5/20    FAX #: [phone]     IMPORTANT: FOR YOUR IMMEDIATE ATTENTION  Please FAX all test results listed bel

## (undated) NOTE — ED AVS SNAPSHOT
Rosa Mcdermott   MRN: [id number]    Department:  [facility] Emergency Department   Date of Visit:  6/28/2019           Disclosure     Insurance plans vary and the physician(s) referred by the ER may not be covered by your plan.  Please contact your tell this physician (or your personal doctor if your instructions are to return to your personal doctor) about any new or lasting problems. The primary care or specialist physician will see patients referred from the [facility] Emergency Department.  Roland Bridges

## (undated) NOTE — ED AVS SNAPSHOT
Mrs. Ev Sagastume   MRN: [id number]    Department:  [facility] Emergency Department   Date of Visit:  11/22/2017           Disclosure     Insurance plans vary and the physician(s) referred by the ER may not be covered by your plan.  Please contac tell this physician (or your personal doctor if your instructions are to return to your personal doctor) about any new or lasting problems. The primary care or specialist physician will see patients referred from the [facility] Emergency Department.  Forrest Iqbal